# Patient Record
Sex: MALE | Race: BLACK OR AFRICAN AMERICAN | Employment: FULL TIME | URBAN - METROPOLITAN AREA
[De-identification: names, ages, dates, MRNs, and addresses within clinical notes are randomized per-mention and may not be internally consistent; named-entity substitution may affect disease eponyms.]

---

## 2018-02-02 ENCOUNTER — APPOINTMENT (OUTPATIENT)
Dept: GENERAL RADIOLOGY | Age: 68
DRG: 981 | End: 2018-02-02
Attending: INTERNAL MEDICINE
Payer: COMMERCIAL

## 2018-02-02 ENCOUNTER — ANESTHESIA EVENT (OUTPATIENT)
Dept: ENDOSCOPY | Age: 68
DRG: 981 | End: 2018-02-02
Payer: COMMERCIAL

## 2018-02-02 ENCOUNTER — APPOINTMENT (OUTPATIENT)
Dept: INTERVENTIONAL RADIOLOGY/VASCULAR | Age: 68
DRG: 981 | End: 2018-02-02
Attending: SPECIALIST
Payer: COMMERCIAL

## 2018-02-02 ENCOUNTER — APPOINTMENT (OUTPATIENT)
Dept: CT IMAGING | Age: 68
DRG: 981 | End: 2018-02-02
Attending: INTERNAL MEDICINE
Payer: COMMERCIAL

## 2018-02-02 ENCOUNTER — ANESTHESIA (OUTPATIENT)
Dept: INTERVENTIONAL RADIOLOGY/VASCULAR | Age: 68
DRG: 981 | End: 2018-02-02
Payer: COMMERCIAL

## 2018-02-02 ENCOUNTER — HOSPITAL ENCOUNTER (INPATIENT)
Age: 68
LOS: 5 days | Discharge: NURSING, REHAB OR LONG-TERM CARE | DRG: 981 | End: 2018-02-07
Attending: EMERGENCY MEDICINE | Admitting: INTERNAL MEDICINE
Payer: COMMERCIAL

## 2018-02-02 ENCOUNTER — ANESTHESIA (OUTPATIENT)
Dept: ENDOSCOPY | Age: 68
DRG: 981 | End: 2018-02-02
Payer: COMMERCIAL

## 2018-02-02 ENCOUNTER — ANESTHESIA EVENT (OUTPATIENT)
Dept: INTERVENTIONAL RADIOLOGY/VASCULAR | Age: 68
DRG: 981 | End: 2018-02-02
Payer: COMMERCIAL

## 2018-02-02 DIAGNOSIS — R19.7 NAUSEA VOMITING AND DIARRHEA: Primary | ICD-10-CM

## 2018-02-02 DIAGNOSIS — E86.0 DEHYDRATION: ICD-10-CM

## 2018-02-02 DIAGNOSIS — R11.2 NAUSEA VOMITING AND DIARRHEA: Primary | ICD-10-CM

## 2018-02-02 PROBLEM — K92.2 LOWER GI BLEED: Status: ACTIVE | Noted: 2018-02-02

## 2018-02-02 PROBLEM — R31.9 HEMATURIA: Status: ACTIVE | Noted: 2018-02-02

## 2018-02-02 PROBLEM — D62 ACUTE BLOOD LOSS ANEMIA: Status: ACTIVE | Noted: 2018-02-02

## 2018-02-02 PROBLEM — K92.2 UPPER GI BLEEDING: Status: ACTIVE | Noted: 2018-02-02

## 2018-02-02 LAB
ALBUMIN SERPL-MCNC: 2.9 G/DL (ref 3.5–5)
ALBUMIN/GLOB SERPL: 0.9 {RATIO} (ref 1.1–2.2)
ALP SERPL-CCNC: 73 U/L (ref 45–117)
ALT SERPL-CCNC: 20 U/L (ref 12–78)
ANION GAP SERPL CALC-SCNC: 10 MMOL/L (ref 5–15)
APPEARANCE UR: CLEAR
APPEARANCE UR: CLEAR
ARTERIAL PATENCY WRIST A: YES
AST SERPL-CCNC: 16 U/L (ref 15–37)
BACTERIA URNS QL MICRO: NEGATIVE /HPF
BACTERIA URNS QL MICRO: NEGATIVE /HPF
BASE DEFICIT BLDA-SCNC: 4 MMOL/L
BASOPHILS # BLD: 0.1 K/UL (ref 0–0.1)
BASOPHILS NFR BLD: 1 % (ref 0–1)
BDY SITE: ABNORMAL
BILIRUB SERPL-MCNC: 0.3 MG/DL (ref 0.2–1)
BILIRUB UR QL: NEGATIVE
BILIRUB UR QL: NEGATIVE
BREATHS.SPONTANEOUS ON VENT: 28
BUN SERPL-MCNC: 36 MG/DL (ref 6–20)
BUN/CREAT SERPL: 26 (ref 12–20)
CALCIUM SERPL-MCNC: 8.1 MG/DL (ref 8.5–10.1)
CHLORIDE SERPL-SCNC: 104 MMOL/L (ref 97–108)
CO2 SERPL-SCNC: 23 MMOL/L (ref 21–32)
COLOR UR: ABNORMAL
COLOR UR: ABNORMAL
CREAT SERPL-MCNC: 1.37 MG/DL (ref 0.7–1.3)
DIFFERENTIAL METHOD BLD: ABNORMAL
EOSINOPHIL # BLD: 0.1 K/UL (ref 0–0.4)
EOSINOPHIL NFR BLD: 0 % (ref 0–7)
EPITH CASTS URNS QL MICRO: ABNORMAL /LPF
EPITH CASTS URNS QL MICRO: ABNORMAL /LPF
ERYTHROCYTE [DISTWIDTH] IN BLOOD BY AUTOMATED COUNT: 13.2 % (ref 11.5–14.5)
FIO2 ON VENT: 50 %
FLUAV AG NPH QL IA: NEGATIVE
FLUBV AG NOSE QL IA: NEGATIVE
GAS FLOW.O2 O2 DELIVERY SYS: 12 L/MIN
GLOBULIN SER CALC-MCNC: 3.3 G/DL (ref 2–4)
GLUCOSE SERPL-MCNC: 135 MG/DL (ref 65–100)
GLUCOSE UR STRIP.AUTO-MCNC: NEGATIVE MG/DL
GLUCOSE UR STRIP.AUTO-MCNC: NEGATIVE MG/DL
H PYLORI FROM TISSUE: POSITIVE
HCO3 BLDA-SCNC: 19 MMOL/L (ref 22–26)
HCT VFR BLD AUTO: 28.5 % (ref 36.6–50.3)
HCT VFR BLD AUTO: 31.1 % (ref 36.6–50.3)
HEMOCCULT STL QL: POSITIVE
HGB BLD-MCNC: 10 G/DL (ref 12.1–17)
HGB BLD-MCNC: 9.7 G/DL (ref 12.1–17)
HGB UR QL STRIP: ABNORMAL
HGB UR QL STRIP: ABNORMAL
HYALINE CASTS URNS QL MICRO: ABNORMAL /LPF (ref 0–5)
IMM GRANULOCYTES # BLD: 0.1 K/UL (ref 0–0.04)
IMM GRANULOCYTES NFR BLD AUTO: 1 % (ref 0–0.5)
INR PPP: 1.1 (ref 0.9–1.1)
KETONES UR QL STRIP.AUTO: NEGATIVE MG/DL
KETONES UR QL STRIP.AUTO: NEGATIVE MG/DL
KIT LOT NO., HCLOLOT: NORMAL
LACTATE SERPL-SCNC: 1.3 MMOL/L (ref 0.4–2)
LACTATE SERPL-SCNC: 2.5 MMOL/L (ref 0.4–2)
LEUKOCYTE ESTERASE UR QL STRIP.AUTO: NEGATIVE
LEUKOCYTE ESTERASE UR QL STRIP.AUTO: NEGATIVE
LIPASE SERPL-CCNC: 775 U/L (ref 73–393)
LYMPHOCYTES # BLD: 2.3 K/UL (ref 0.8–3.5)
LYMPHOCYTES NFR BLD: 19 % (ref 12–49)
MCH RBC QN AUTO: 31.4 PG (ref 26–34)
MCHC RBC AUTO-ENTMCNC: 32.2 G/DL (ref 30–36.5)
MCV RBC AUTO: 97.8 FL (ref 80–99)
MONOCYTES # BLD: 0.8 K/UL (ref 0–1)
MONOCYTES NFR BLD: 7 % (ref 5–13)
NEGATIVE CONTROL: NEGATIVE
NEUTS SEG # BLD: 8.5 K/UL (ref 1.8–8)
NEUTS SEG NFR BLD: 72 % (ref 32–75)
NITRITE UR QL STRIP.AUTO: NEGATIVE
NITRITE UR QL STRIP.AUTO: NEGATIVE
NRBC # BLD: 0 K/UL (ref 0–0.01)
NRBC BLD-RTO: 0 PER 100 WBC
PCO2 BLDA: 31 MMHG (ref 35–45)
PH BLDA: 7.41 [PH] (ref 7.35–7.45)
PH UR STRIP: 5 [PH] (ref 5–8)
PH UR STRIP: 5 [PH] (ref 5–8)
PLATELET # BLD AUTO: 252 K/UL (ref 150–400)
PMV BLD AUTO: 10 FL (ref 8.9–12.9)
PO2 BLDA: 98 MMHG (ref 80–100)
POSITIVE CONTROL: POSITIVE
POTASSIUM SERPL-SCNC: 4.1 MMOL/L (ref 3.5–5.1)
PROT SERPL-MCNC: 6.2 G/DL (ref 6.4–8.2)
PROT UR STRIP-MCNC: NEGATIVE MG/DL
PROT UR STRIP-MCNC: NEGATIVE MG/DL
PROTHROMBIN TIME: 10.6 SEC (ref 9–11.1)
RBC # BLD AUTO: 3.18 M/UL (ref 4.1–5.7)
RBC #/AREA URNS HPF: ABNORMAL /HPF (ref 0–5)
RBC #/AREA URNS HPF: ABNORMAL /HPF (ref 0–5)
SAO2 % BLD: 98 % (ref 92–97)
SAO2% DEVICE SAO2% SENSOR NAME: ABNORMAL
SODIUM SERPL-SCNC: 137 MMOL/L (ref 136–145)
SP GR UR REFRACTOMETRY: 1.02 (ref 1–1.03)
SP GR UR REFRACTOMETRY: 1.03 (ref 1–1.03)
SPECIMEN SITE: ABNORMAL
UA: UC IF INDICATED,UAUC: ABNORMAL
UROBILINOGEN UR QL STRIP.AUTO: 0.2 EU/DL (ref 0.2–1)
UROBILINOGEN UR QL STRIP.AUTO: 0.2 EU/DL (ref 0.2–1)
WBC # BLD AUTO: 11.8 K/UL (ref 4.1–11.1)
WBC URNS QL MICRO: ABNORMAL /HPF (ref 0–4)
WBC URNS QL MICRO: ABNORMAL /HPF (ref 0–4)

## 2018-02-02 PROCEDURE — 96361 HYDRATE IV INFUSION ADD-ON: CPT

## 2018-02-02 PROCEDURE — 81001 URINALYSIS AUTO W/SCOPE: CPT | Performed by: INTERNAL MEDICINE

## 2018-02-02 PROCEDURE — 74011250636 HC RX REV CODE- 250/636: Performed by: SPECIALIST

## 2018-02-02 PROCEDURE — 36600 WITHDRAWAL OF ARTERIAL BLOOD: CPT | Performed by: INTERNAL MEDICINE

## 2018-02-02 PROCEDURE — 77030033508 HC COIL EMB AZUR CX TERU -G

## 2018-02-02 PROCEDURE — 83690 ASSAY OF LIPASE: CPT | Performed by: EMERGENCY MEDICINE

## 2018-02-02 PROCEDURE — 74011250636 HC RX REV CODE- 250/636: Performed by: INTERNAL MEDICINE

## 2018-02-02 PROCEDURE — 77030014021 HC SYR ANGI FIX LOK MRTM -A

## 2018-02-02 PROCEDURE — 0W3P8ZZ CONTROL BLEEDING IN GASTROINTESTINAL TRACT, VIA NATURAL OR ARTIFICIAL OPENING ENDOSCOPIC: ICD-10-PCS | Performed by: SPECIALIST

## 2018-02-02 PROCEDURE — 99285 EMERGENCY DEPT VISIT HI MDM: CPT

## 2018-02-02 PROCEDURE — 85610 PROTHROMBIN TIME: CPT | Performed by: EMERGENCY MEDICINE

## 2018-02-02 PROCEDURE — C1894 INTRO/SHEATH, NON-LASER: HCPCS

## 2018-02-02 PROCEDURE — 71045 X-RAY EXAM CHEST 1 VIEW: CPT

## 2018-02-02 PROCEDURE — 74011000250 HC RX REV CODE- 250: Performed by: RADIOLOGY

## 2018-02-02 PROCEDURE — 74176 CT ABD & PELVIS W/O CONTRAST: CPT

## 2018-02-02 PROCEDURE — 36430 TRANSFUSION BLD/BLD COMPNT: CPT

## 2018-02-02 PROCEDURE — 96374 THER/PROPH/DIAG INJ IV PUSH: CPT

## 2018-02-02 PROCEDURE — C9113 INJ PANTOPRAZOLE SODIUM, VIA: HCPCS | Performed by: EMERGENCY MEDICINE

## 2018-02-02 PROCEDURE — C1769 GUIDE WIRE: HCPCS

## 2018-02-02 PROCEDURE — 77030003657 HC NDL SCLER BSC -B: Performed by: SPECIALIST

## 2018-02-02 PROCEDURE — 77030007181 HC COIL EMB TORN COOK -B

## 2018-02-02 PROCEDURE — 81001 URINALYSIS AUTO W/SCOPE: CPT | Performed by: EMERGENCY MEDICINE

## 2018-02-02 PROCEDURE — P9016 RBC LEUKOCYTES REDUCED: HCPCS | Performed by: EMERGENCY MEDICINE

## 2018-02-02 PROCEDURE — 65610000006 HC RM INTENSIVE CARE

## 2018-02-02 PROCEDURE — 77030019988 HC FCPS ENDOSC DISP BSC -B: Performed by: SPECIALIST

## 2018-02-02 PROCEDURE — C1760 CLOSURE DEV, VASC: HCPCS

## 2018-02-02 PROCEDURE — 83605 ASSAY OF LACTIC ACID: CPT | Performed by: INTERNAL MEDICINE

## 2018-02-02 PROCEDURE — 76060000033 HC ANESTHESIA 1 TO 1.5 HR

## 2018-02-02 PROCEDURE — 74011250637 HC RX REV CODE- 250/637: Performed by: INTERNAL MEDICINE

## 2018-02-02 PROCEDURE — 82803 BLOOD GASES ANY COMBINATION: CPT | Performed by: INTERNAL MEDICINE

## 2018-02-02 PROCEDURE — 86923 COMPATIBILITY TEST ELECTRIC: CPT | Performed by: EMERGENCY MEDICINE

## 2018-02-02 PROCEDURE — 75726 ARTERY X-RAYS ABDOMEN: CPT

## 2018-02-02 PROCEDURE — C9113 INJ PANTOPRAZOLE SODIUM, VIA: HCPCS | Performed by: INTERNAL MEDICINE

## 2018-02-02 PROCEDURE — 74011250636 HC RX REV CODE- 250/636

## 2018-02-02 PROCEDURE — B410YZZ FLUOROSCOPY OF ABDOMINAL AORTA USING OTHER CONTRAST: ICD-10-PCS | Performed by: RADIOLOGY

## 2018-02-02 PROCEDURE — 77030010324 HC TBNG CNTRST MRTM -A

## 2018-02-02 PROCEDURE — 74011000250 HC RX REV CODE- 250: Performed by: INTERNAL MEDICINE

## 2018-02-02 PROCEDURE — 04V33DZ RESTRICTION OF HEPATIC ARTERY WITH INTRALUMINAL DEVICE, PERCUTANEOUS APPROACH: ICD-10-PCS | Performed by: RADIOLOGY

## 2018-02-02 PROCEDURE — 77030010936 HC CLP LIG BSC -C: Performed by: SPECIALIST

## 2018-02-02 PROCEDURE — 36415 COLL VENOUS BLD VENIPUNCTURE: CPT | Performed by: INTERNAL MEDICINE

## 2018-02-02 PROCEDURE — 86900 BLOOD TYPING SEROLOGIC ABO: CPT | Performed by: EMERGENCY MEDICINE

## 2018-02-02 PROCEDURE — C1892 INTRO/SHEATH,FIXED,PEEL-AWAY: HCPCS

## 2018-02-02 PROCEDURE — 74011636320 HC RX REV CODE- 636/320: Performed by: RADIOLOGY

## 2018-02-02 PROCEDURE — 88312 SPECIAL STAINS GROUP 1: CPT | Performed by: INTERNAL MEDICINE

## 2018-02-02 PROCEDURE — 88112 CYTOPATH CELL ENHANCE TECH: CPT | Performed by: INTERNAL MEDICINE

## 2018-02-02 PROCEDURE — 74011250636 HC RX REV CODE- 250/636: Performed by: EMERGENCY MEDICINE

## 2018-02-02 PROCEDURE — 77010033678 HC OXYGEN DAILY

## 2018-02-02 PROCEDURE — 76060000032 HC ANESTHESIA 0.5 TO 1 HR: Performed by: SPECIALIST

## 2018-02-02 PROCEDURE — 87040 BLOOD CULTURE FOR BACTERIA: CPT | Performed by: INTERNAL MEDICINE

## 2018-02-02 PROCEDURE — 74011000250 HC RX REV CODE- 250

## 2018-02-02 PROCEDURE — 80053 COMPREHEN METABOLIC PANEL: CPT | Performed by: EMERGENCY MEDICINE

## 2018-02-02 PROCEDURE — 76040000007: Performed by: SPECIALIST

## 2018-02-02 PROCEDURE — 74011000258 HC RX REV CODE- 258: Performed by: INTERNAL MEDICINE

## 2018-02-02 PROCEDURE — 85025 COMPLETE CBC W/AUTO DIFF WBC: CPT | Performed by: EMERGENCY MEDICINE

## 2018-02-02 PROCEDURE — 82272 OCCULT BLD FECES 1-3 TESTS: CPT | Performed by: SPECIALIST

## 2018-02-02 PROCEDURE — 30233N1 TRANSFUSION OF NONAUTOLOGOUS RED BLOOD CELLS INTO PERIPHERAL VEIN, PERCUTANEOUS APPROACH: ICD-10-PCS | Performed by: INTERNAL MEDICINE

## 2018-02-02 PROCEDURE — 77030013079 HC BLNKT BAIR HGGR 3M -A

## 2018-02-02 PROCEDURE — 87804 INFLUENZA ASSAY W/OPTIC: CPT | Performed by: EMERGENCY MEDICINE

## 2018-02-02 PROCEDURE — 0DB68ZX EXCISION OF STOMACH, VIA NATURAL OR ARTIFICIAL OPENING ENDOSCOPIC, DIAGNOSTIC: ICD-10-PCS | Performed by: SPECIALIST

## 2018-02-02 PROCEDURE — 85018 HEMOGLOBIN: CPT | Performed by: INTERNAL MEDICINE

## 2018-02-02 PROCEDURE — C1887 CATHETER, GUIDING: HCPCS

## 2018-02-02 PROCEDURE — 77030019698 HC SYR ANGI MDLON MRTM -A

## 2018-02-02 PROCEDURE — 74011250636 HC RX REV CODE- 250/636: Performed by: RADIOLOGY

## 2018-02-02 PROCEDURE — 87077 CULTURE AEROBIC IDENTIFY: CPT | Performed by: SPECIALIST

## 2018-02-02 PROCEDURE — 77030016712 HC CATH ANGI DX SVU3 ANGI -B

## 2018-02-02 RX ORDER — SODIUM CHLORIDE 0.9 % (FLUSH) 0.9 %
5-10 SYRINGE (ML) INJECTION AS NEEDED
Status: DISCONTINUED | OUTPATIENT
Start: 2018-02-02 | End: 2018-02-07 | Stop reason: HOSPADM

## 2018-02-02 RX ORDER — PANTOPRAZOLE SODIUM 40 MG/10ML
40 INJECTION, POWDER, LYOPHILIZED, FOR SOLUTION INTRAVENOUS
Status: COMPLETED | OUTPATIENT
Start: 2018-02-02 | End: 2018-02-02

## 2018-02-02 RX ORDER — PHENYLEPHRINE HCL IN 0.9% NACL 0.4MG/10ML
SYRINGE (ML) INTRAVENOUS AS NEEDED
Status: DISCONTINUED | OUTPATIENT
Start: 2018-02-02 | End: 2018-02-02 | Stop reason: HOSPADM

## 2018-02-02 RX ORDER — DEXTROMETHORPHAN/PSEUDOEPHED 2.5-7.5/.8
1.2 DROPS ORAL
Status: DISCONTINUED | OUTPATIENT
Start: 2018-02-02 | End: 2018-02-02 | Stop reason: HOSPADM

## 2018-02-02 RX ORDER — FENTANYL CITRATE 50 UG/ML
25 INJECTION, SOLUTION INTRAMUSCULAR; INTRAVENOUS
Status: DISCONTINUED | OUTPATIENT
Start: 2018-02-02 | End: 2018-02-02 | Stop reason: HOSPADM

## 2018-02-02 RX ORDER — HEPARIN SODIUM 200 [USP'U]/100ML
800 INJECTION, SOLUTION INTRAVENOUS ONCE
Status: COMPLETED | OUTPATIENT
Start: 2018-02-02 | End: 2018-02-05

## 2018-02-02 RX ORDER — LIDOCAINE HYDROCHLORIDE 20 MG/ML
18 INJECTION, SOLUTION INFILTRATION; PERINEURAL ONCE
Status: COMPLETED | OUTPATIENT
Start: 2018-02-02 | End: 2018-02-02

## 2018-02-02 RX ORDER — MUPIROCIN 20 MG/G
OINTMENT TOPICAL EVERY 12 HOURS
Status: DISCONTINUED | OUTPATIENT
Start: 2018-02-03 | End: 2018-02-07 | Stop reason: HOSPADM

## 2018-02-02 RX ORDER — ONDANSETRON 2 MG/ML
8 INJECTION INTRAMUSCULAR; INTRAVENOUS
Status: COMPLETED | OUTPATIENT
Start: 2018-02-02 | End: 2018-02-02

## 2018-02-02 RX ORDER — SODIUM CHLORIDE 9 MG/ML
250 INJECTION, SOLUTION INTRAVENOUS AS NEEDED
Status: DISCONTINUED | OUTPATIENT
Start: 2018-02-02 | End: 2018-02-05

## 2018-02-02 RX ORDER — SODIUM CHLORIDE 9 MG/ML
50 INJECTION, SOLUTION INTRAVENOUS CONTINUOUS
Status: DISPENSED | OUTPATIENT
Start: 2018-02-02 | End: 2018-02-02

## 2018-02-02 RX ORDER — ACETAMINOPHEN 10 MG/ML
1000 INJECTION, SOLUTION INTRAVENOUS
Status: DISCONTINUED | OUTPATIENT
Start: 2018-02-02 | End: 2018-02-02

## 2018-02-02 RX ORDER — FENTANYL CITRATE 50 UG/ML
INJECTION, SOLUTION INTRAMUSCULAR; INTRAVENOUS AS NEEDED
Status: DISCONTINUED | OUTPATIENT
Start: 2018-02-02 | End: 2018-02-02 | Stop reason: HOSPADM

## 2018-02-02 RX ORDER — SODIUM CHLORIDE 0.9 % (FLUSH) 0.9 %
5-10 SYRINGE (ML) INJECTION AS NEEDED
Status: ACTIVE | OUTPATIENT
Start: 2018-02-02 | End: 2018-02-02

## 2018-02-02 RX ORDER — PROPOFOL 10 MG/ML
INJECTION, EMULSION INTRAVENOUS AS NEEDED
Status: DISCONTINUED | OUTPATIENT
Start: 2018-02-02 | End: 2018-02-02 | Stop reason: HOSPADM

## 2018-02-02 RX ORDER — ONDANSETRON 2 MG/ML
4 INJECTION INTRAMUSCULAR; INTRAVENOUS
Status: DISCONTINUED | OUTPATIENT
Start: 2018-02-02 | End: 2018-02-07 | Stop reason: HOSPADM

## 2018-02-02 RX ORDER — SODIUM CHLORIDE 0.9 % (FLUSH) 0.9 %
5-10 SYRINGE (ML) INJECTION EVERY 8 HOURS
Status: DISCONTINUED | OUTPATIENT
Start: 2018-02-02 | End: 2018-02-07 | Stop reason: HOSPADM

## 2018-02-02 RX ORDER — GUAIFENESIN 100 MG/5ML
81 LIQUID (ML) ORAL DAILY
COMMUNITY
End: 2018-02-07

## 2018-02-02 RX ORDER — SODIUM CHLORIDE 0.9 % (FLUSH) 0.9 %
5-10 SYRINGE (ML) INJECTION EVERY 8 HOURS
Status: ACTIVE | OUTPATIENT
Start: 2018-02-02 | End: 2018-02-02

## 2018-02-02 RX ORDER — SODIUM CHLORIDE 9 MG/ML
INJECTION INTRAMUSCULAR; INTRAVENOUS; SUBCUTANEOUS
Status: COMPLETED
Start: 2018-02-02 | End: 2018-02-02

## 2018-02-02 RX ORDER — ACETAMINOPHEN 650 MG/1
650 SUPPOSITORY RECTAL ONCE
Status: COMPLETED | OUTPATIENT
Start: 2018-02-02 | End: 2018-02-02

## 2018-02-02 RX ORDER — SODIUM CHLORIDE 0.9 % (FLUSH) 0.9 %
5-10 SYRINGE (ML) INJECTION EVERY 8 HOURS
Status: DISPENSED | OUTPATIENT
Start: 2018-02-02 | End: 2018-02-02

## 2018-02-02 RX ORDER — EPINEPHRINE 1 MG/ML
0.2 INJECTION, SOLUTION, CONCENTRATE INTRAVENOUS ONCE
Status: ACTIVE | OUTPATIENT
Start: 2018-02-02 | End: 2018-02-03

## 2018-02-02 RX ORDER — SODIUM CHLORIDE 9 MG/ML
INJECTION, SOLUTION INTRAVENOUS
Status: DISCONTINUED | OUTPATIENT
Start: 2018-02-02 | End: 2018-02-02 | Stop reason: HOSPADM

## 2018-02-02 RX ORDER — MIDAZOLAM HYDROCHLORIDE 1 MG/ML
1-2 INJECTION, SOLUTION INTRAMUSCULAR; INTRAVENOUS
Status: DISCONTINUED | OUTPATIENT
Start: 2018-02-02 | End: 2018-02-02 | Stop reason: HOSPADM

## 2018-02-02 RX ORDER — KETAMINE HYDROCHLORIDE 100 MG/ML
INJECTION, SOLUTION INTRAMUSCULAR; INTRAVENOUS
Status: DISPENSED
Start: 2018-02-02 | End: 2018-02-03

## 2018-02-02 RX ORDER — FENTANYL CITRATE 50 UG/ML
INJECTION, SOLUTION INTRAMUSCULAR; INTRAVENOUS
Status: COMPLETED
Start: 2018-02-02 | End: 2018-02-02

## 2018-02-02 RX ORDER — SODIUM CHLORIDE 9 MG/ML
75 INJECTION, SOLUTION INTRAVENOUS CONTINUOUS
Status: DISCONTINUED | OUTPATIENT
Start: 2018-02-02 | End: 2018-02-04

## 2018-02-02 RX ORDER — LIDOCAINE HYDROCHLORIDE 20 MG/ML
INJECTION, SOLUTION EPIDURAL; INFILTRATION; INTRACAUDAL; PERINEURAL AS NEEDED
Status: DISCONTINUED | OUTPATIENT
Start: 2018-02-02 | End: 2018-02-02 | Stop reason: HOSPADM

## 2018-02-02 RX ORDER — EPINEPHRINE 0.1 MG/ML
1 INJECTION INTRACARDIAC; INTRAVENOUS ONCE
Status: COMPLETED | OUTPATIENT
Start: 2018-02-02 | End: 2018-02-02

## 2018-02-02 RX ADMIN — SODIUM CHLORIDE 1000 ML: 900 INJECTION, SOLUTION INTRAVENOUS at 02:32

## 2018-02-02 RX ADMIN — SODIUM CHLORIDE: 9 INJECTION, SOLUTION INTRAVENOUS at 17:03

## 2018-02-02 RX ADMIN — EPINEPHRINE 1 MG: 0.1 INJECTION, SOLUTION ENDOTRACHEAL; INTRACARDIAC; INTRAVENOUS at 16:26

## 2018-02-02 RX ADMIN — ONDANSETRON HYDROCHLORIDE 8 MG: 2 INJECTION, SOLUTION INTRAMUSCULAR; INTRAVENOUS at 02:32

## 2018-02-02 RX ADMIN — Medication 40 MCG: at 16:21

## 2018-02-02 RX ADMIN — SODIUM CHLORIDE 10 ML: 9 INJECTION, SOLUTION INTRAMUSCULAR; INTRAVENOUS; SUBCUTANEOUS at 06:54

## 2018-02-02 RX ADMIN — SODIUM CHLORIDE 2000 ML: 900 INJECTION, SOLUTION INTRAVENOUS at 05:12

## 2018-02-02 RX ADMIN — ACETAMINOPHEN 650 MG: 650 SUPPOSITORY RECTAL at 20:53

## 2018-02-02 RX ADMIN — FENTANYL CITRATE 25 MCG: 50 INJECTION, SOLUTION INTRAMUSCULAR; INTRAVENOUS at 17:53

## 2018-02-02 RX ADMIN — SODIUM CHLORIDE 1000 ML: 900 INJECTION, SOLUTION INTRAVENOUS at 09:40

## 2018-02-02 RX ADMIN — PANTOPRAZOLE SODIUM 40 MG: 40 INJECTION, POWDER, FOR SOLUTION INTRAVENOUS at 06:53

## 2018-02-02 RX ADMIN — PANTOPRAZOLE SODIUM 40 MG: 40 INJECTION, POWDER, FOR SOLUTION INTRAVENOUS at 19:02

## 2018-02-02 RX ADMIN — Medication 40 MCG: at 16:19

## 2018-02-02 RX ADMIN — PROPOFOL 50 MG: 10 INJECTION, EMULSION INTRAVENOUS at 16:25

## 2018-02-02 RX ADMIN — SODIUM CHLORIDE: 900 INJECTION, SOLUTION INTRAVENOUS at 16:38

## 2018-02-02 RX ADMIN — MEROPENEM 1 G: 1 INJECTION, POWDER, FOR SOLUTION INTRAVENOUS at 21:20

## 2018-02-02 RX ADMIN — PROPOFOL 20 MG: 10 INJECTION, EMULSION INTRAVENOUS at 17:15

## 2018-02-02 RX ADMIN — PROPOFOL 50 MG: 10 INJECTION, EMULSION INTRAVENOUS at 16:21

## 2018-02-02 RX ADMIN — FENTANYL CITRATE 25 MCG: 50 INJECTION, SOLUTION INTRAMUSCULAR; INTRAVENOUS at 17:56

## 2018-02-02 RX ADMIN — FENTANYL CITRATE 25 MCG: 50 INJECTION, SOLUTION INTRAMUSCULAR; INTRAVENOUS at 18:00

## 2018-02-02 RX ADMIN — PROPOFOL 50 MG: 10 INJECTION, EMULSION INTRAVENOUS at 16:30

## 2018-02-02 RX ADMIN — FENTANYL CITRATE 25 MCG: 50 INJECTION, SOLUTION INTRAMUSCULAR; INTRAVENOUS at 17:50

## 2018-02-02 RX ADMIN — HEPARIN SODIUM IN SODIUM CHLORIDE 1000 UNITS: 200 INJECTION INTRAVENOUS at 17:45

## 2018-02-02 RX ADMIN — PROPOFOL 20 MG: 10 INJECTION, EMULSION INTRAVENOUS at 17:20

## 2018-02-02 RX ADMIN — LIDOCAINE HYDROCHLORIDE 50 MG: 20 INJECTION, SOLUTION EPIDURAL; INFILTRATION; INTRACAUDAL; PERINEURAL at 16:21

## 2018-02-02 RX ADMIN — IOPAMIDOL 90 ML: 755 INJECTION, SOLUTION INTRAVENOUS at 17:45

## 2018-02-02 RX ADMIN — PROPOFOL 50 MG: 10 INJECTION, EMULSION INTRAVENOUS at 16:38

## 2018-02-02 RX ADMIN — Medication 10 ML: at 21:19

## 2018-02-02 RX ADMIN — PROPOFOL 20 MG: 10 INJECTION, EMULSION INTRAVENOUS at 17:09

## 2018-02-02 RX ADMIN — SODIUM CHLORIDE 125 ML/HR: 900 INJECTION, SOLUTION INTRAVENOUS at 11:19

## 2018-02-02 RX ADMIN — SODIUM CHLORIDE 1000 ML: 900 INJECTION, SOLUTION INTRAVENOUS at 03:34

## 2018-02-02 RX ADMIN — LIDOCAINE HYDROCHLORIDE 360 MG: 20 INJECTION, SOLUTION INFILTRATION; PERINEURAL at 17:45

## 2018-02-02 NOTE — PROGRESS NOTES
1840 Pt received and temp 102.3 Call placed to  [facility] orders received informed of recent flu exposure and temp after transfusion 1850 bladder gan grady reveals [phone] call to Dr Yvette Yeung pt refused staright cath 1930 pt voided 200 Bedside shift change report given to Meseret Escamilla (oncoming nurse) by Anthony Beach  (offgoing nurse). Report included the following information SBAR, Kardex, ED Summary, OR Summary, Procedure Summary, Intake/Output and MAR.

## 2018-02-02 NOTE — ANESTHESIA PREPROCEDURE EVALUATION
Anesthetic History   No history of anesthetic complications            Review of Systems / Medical History  Patient summary reviewed, nursing notes reviewed and pertinent labs reviewed    Pulmonary  Within defined limits                 Neuro/Psych   Within defined limits           Cardiovascular  Within defined limits                     GI/Hepatic/Renal         Renal disease: ARF       Endo/Other        Anemia     Other Findings   Comments: Upper GIB    Acute Pancreatitis/ETOH abuse         Physical Exam    Airway  Mallampati: II  TM Distance: > 6 cm  Neck ROM: normal range of motion   Mouth opening: Normal     Cardiovascular  Regular rate and rhythm,  S1 and S2 normal,  no murmur, click, rub, or gallop             Dental    Dentition: Poor dentition  Comments: Several missing teeth, moderate decay   Pulmonary  Breath sounds clear to auscultation               Abdominal  GI exam deferred       Other Findings            Anesthetic Plan    ASA: 3  Anesthesia type: total IV anesthesia and MAC          Induction: Intravenous  Anesthetic plan and risks discussed with: Patient

## 2018-02-02 NOTE — ED NOTES
Bedside shift change report given to Saad CARRENO RN (oncoming nurse) by Joy Thompson RN (offgoing nurse). Report included the following information SBAR, Kardex, ED Summary, [facility] and Recent Results. Pt waiting on transport to [facility] at this time.

## 2018-02-02 NOTE — ED TRIAGE NOTES
Pt arrived via EMS complaining of nausea and vomiting x6 times with some chest tightness onset 45 min ago. Pt denies any illness prior to 45 min ago. VSS per EMS. Pt in no acute distress. Will continue to monitor. Call bell within reach.

## 2018-02-02 NOTE — PROGRESS NOTES
Spiritual Care Partner Volunteer visited patient in [place] on 2/2/18. Documented by:  Vijay Ruiz M.Div.    Paging Service [phone])

## 2018-02-02 NOTE — PROCEDURES
Esophagogastroduodenoscopy Procedure Note      Mar Redding Sr.  1950  [id number]    Indication: Acute Upper GI Hemorrhage     Endoscopist: Hui Larsen MD    Referring Provider:  Wilfredo Luis MD    Sedation:  MAC anesthesia Propofol    Procedure Details:  After infomed consent was obtained for the procedure, with all risks and benefits of procedure explained the patient was taken to the endoscopy suite and placed in the left lateral decubitus position. Following sequential administration of sedation as per above, the endoscope was inserted into the mouth and advanced under direct vision to second portion of the duodenum. A careful inspection was made as the gastroscope was withdrawn, including a retroflexed view of the proximal stomach; findings and interventions are described below. Findings:     Esophagus:   + There was whitish exudate in the esophagus in lower 1/2 c/w Candida Esophagitis s/p brushing. Stomach:   - Mild erythema diffusely s/p KALPANA bx to r/o H Pylori. Duodenum:   ++ Ulcer crater with a dark clot and a pigemented protuberance in the center with oozing of red blood from peripheral area of ulcer. We first attempted to place a hemoclip and it began to briskly bleed obscuring the lumen then we injected 3 ml of 1:10,000 epinephrine and then used gold probe 7 FR and due to ongoing bleeding we were unable to control bleeding endoscopically and terminated. Therapies: see above    Specimen: KALPANA Bx           Complications:   None were encountered during the procedure.     EBL:  200 ml          Recommendations:   -treat for H pylori with triple tx when taking PO   -IV PPI BID  -STAT angio for embolization : plan d/w Dr. Morel Ax and with sherin Childs MD  2/2/2018  4:46 PM

## 2018-02-02 NOTE — ED PROVIDER NOTES
EMERGENCY DEPARTMENT HISTORY AND PHYSICAL EXAM      Date: 2/2/2018  Patient Name: Pippa Farris.    History of Presenting Illness     No chief complaint on file. History Provided By: Patient    HPI: Pippa Morelos, 76 y.o. male with no pertinent PMHx, presents via EMS to the ED with cc of an acute onset of nausea, vomiting, and diarrhea x2300 yesterday evening. The pt reports associated sx of chills, mild diffuse lower abdominal cramping, and SOB secondary to sx as well. He expresses that he felt normal prior to 2300 but suddenly began vomiting WNR leading him to call EMS. The pt discloses that he has had recent sick contact with his daughter who is flu positive as of 1/29. He denies any exacerbating or relieving sx. He denies any fevers, chest pain, or dysuria. PCP: Minnie Coleman MD    There are no other complaints, changes, or physical findings at this time. Current Outpatient Prescriptions   Medication Sig Dispense Refill    oxycodone-acetaminophen (PERCOCET) 5-325 mg per tablet Take 1-2 Tabs by mouth every four (4) hours as needed for Pain. Must fill antibiotic to fill narcotic. 20 Tab 0       Past History     Past Medical History:  No past medical history on file. Past Surgical History:  No past surgical history on file. Family History:  No family history on file. Social History:  Social History   Substance Use Topics    Smoking status: Not on file    Smokeless tobacco: Not on file    Alcohol use Not on file       Allergies: Allergies   Allergen Reactions    Pcn [Penicillins] Unknown (comments)     Family history         Review of Systems   Review of Systems   Constitutional: Positive for chills. Negative for activity change, appetite change, fever and unexpected weight change. HENT: Negative for congestion. Eyes: Negative for pain and visual disturbance. Respiratory: Positive for shortness of breath (secondary to sx ). Negative for cough.     Cardiovascular: Negative for chest pain. Gastrointestinal: Positive for abdominal pain (diffuse lower, cramping), diarrhea, nausea and vomiting. Genitourinary: Negative for dysuria. Musculoskeletal: Negative for back pain. Skin: Negative for rash. Neurological: Negative for headaches. Physical Exam   Physical Exam   Constitutional: He is oriented to person, place, and time. He appears well-developed and well-nourished. Elderly male in mild to moderate distress  Hot to touch    HENT:   Head: Normocephalic and atraumatic. Mouth/Throat: Oropharynx is clear and moist.   Eyes: Conjunctivae and EOM are normal. Pupils are equal, round, and reactive to light. Right eye exhibits no discharge. Left eye exhibits no discharge. Neck: Normal range of motion. Neck supple. Cardiovascular: Regular rhythm and normal heart sounds. Tachycardia present. No murmur heard. Pulmonary/Chest: Effort normal and breath sounds normal. No respiratory distress. He has no wheezes. He has no rales. Abdominal: Soft. Bowel sounds are normal. He exhibits no distension. There is no tenderness. There is no rebound and no guarding. Musculoskeletal: Normal range of motion. He exhibits no edema. Neurological: He is alert and oriented to person, place, and time. No cranial nerve deficit. He exhibits normal muscle tone. Skin: Skin is warm. No rash noted. He is diaphoretic (mildly ). Nursing note and vitals reviewed.         Diagnostic Study Results     Labs -     Recent Results (from the past 12 hour(s))   CBC WITH AUTOMATED DIFF    Collection Time: 02/02/18  2:25 AM   Result Value Ref Range    WBC 11.8 (H) 4.1 - 11.1 K/uL    RBC 3.18 (L) 4.10 - 5.70 M/uL    HGB 10.0 (L) 12.1 - 17.0 g/dL    HCT 31.1 (L) 36.6 - 50.3 %    MCV 97.8 80.0 - 99.0 FL    MCH 31.4 26.0 - 34.0 PG    MCHC 32.2 30.0 - 36.5 g/dL    RDW 13.2 11.5 - 14.5 %    PLATELET 162 872 - 120 K/uL    MPV 10.0 8.9 - 12.9 FL    NRBC 0.0 0  WBC    ABSOLUTE NRBC 0.00 0.00 - 0.01 K/uL NEUTROPHILS 72 32 - 75 %    LYMPHOCYTES 19 12 - 49 %    MONOCYTES 7 5 - 13 %    EOSINOPHILS 0 0 - 7 %    BASOPHILS 1 0 - 1 %    IMMATURE GRANULOCYTES 1 (H) 0.0 - 0.5 %    ABS. NEUTROPHILS 8.5 (H) 1.8 - 8.0 K/UL    ABS. LYMPHOCYTES 2.3 0.8 - 3.5 K/UL    ABS. MONOCYTES 0.8 0.0 - 1.0 K/UL    ABS. EOSINOPHILS 0.1 0.0 - 0.4 K/UL    ABS. BASOPHILS 0.1 0.0 - 0.1 K/UL    ABS. IMM. GRANS. 0.1 (H) 0.00 - 0.04 K/UL    DF AUTOMATED     METABOLIC PANEL, COMPREHENSIVE    Collection Time: 02/02/18  2:25 AM   Result Value Ref Range    Sodium 137 136 - 145 mmol/L    Potassium 4.1 3.5 - 5.1 mmol/L    Chloride 104 97 - 108 mmol/L    CO2 23 21 - 32 mmol/L    Anion gap 10 5 - 15 mmol/L    Glucose 135 (H) 65 - 100 mg/dL    BUN 36 (H) 6 - 20 MG/DL    Creatinine 1.37 (H) 0.70 - 1.30 MG/DL    BUN/Creatinine ratio 26 (H) 12 - 20      GFR est AA >60 >60 ml/min/1.73m2    GFR est non-AA 52 (L) >60 ml/min/1.73m2    Calcium 8.1 (L) 8.5 - 10.1 MG/DL    Bilirubin, total 0.3 0.2 - 1.0 MG/DL    ALT (SGPT) 20 12 - 78 U/L    AST (SGOT) 16 15 - 37 U/L    Alk. phosphatase 73 45 - 117 U/L    Protein, total 6.2 (L) 6.4 - 8.2 g/dL    Albumin 2.9 (L) 3.5 - 5.0 g/dL    Globulin 3.3 2.0 - 4.0 g/dL    A-G Ratio 0.9 (L) 1.1 - 2.2     INFLUENZA A & B AG (RAPID TEST)    Collection Time: 02/02/18  2:25 AM   Result Value Ref Range    Influenza A Antigen NEGATIVE  NEG      Influenza B Antigen NEGATIVE  NEG           Medical Decision Making   I am the first provider for this patient. I reviewed the vital signs, available nursing notes, past medical history, past surgical history, family history and social history. Vital Signs-Reviewed the patient's vital signs.   Patient Vitals for the past 12 hrs:   Temp Pulse Resp BP SpO2   02/02/18 0505 - (!) 120 - (!) 81/42 -   02/02/18 0502 - 100 - 91/52 -   02/02/18 0500 - 96 - 110/46 -   02/02/18 0300 - - - 109/53 97 %   02/02/18 0232 - - - 96/48 93 %   02/02/18 0206 98.1 °F (36.7 °C) 88 16 99/49 95 %       Pulse Oximetry Analysis - 95% on room air    Cardiac Monitor:   Rate: 88 bpm  Rhythm: Normal Sinus Rhythm        Records Reviewed: Nursing Notes, Old Medical Records, Ambulance Run Sheet, Previous Radiology Studies and Previous Laboratory Studies    Provider Notes (Medical Decision Making):   Flu exposure with sudden onset of sx today. He appears ill and dehydrated but abdominal exam is benign. Vital signs borderline, will monitor for evidence of sepsis. Low suspicion for biliary pathology, pancreatitis, appendicitis. ED Course:   Initial assessment performed. The patients presenting problems have been discussed, and they are in agreement with the care plan formulated and outlined with them. I have encouraged them to ask questions as they arise throughout their visit. Progress Notes:  3:30 IVF infusing. SBP improving with first liter infusion. Continue current management as pt denies any further symptoms. 4:51 AM  The pt has been re-evaluated. He is sitting at the side of the bed, his heart rate is 90, blood pressure has improved to 115 and after receiving 2L of fluids he has only output 150mL. He expresses that he is still feeling weak but is feeling improved. Will continue to monitor. 05:10 Orthostatic vitals extremely positive after 2 liters of NS infusion. Continue with IVF and symptom management. Lipase added. Given lack of source with influenza sick contacts, will hold antibiotics and discuss with IM for admission. CONSULT NOTE:   5:13 AM  Jovani Guzmán MD spoke with Dr. Stephanie Mcclelland,   Specialty: Hospitalist  Discussed pt's hx, disposition, and available diagnostic and imaging results. Reviewed care plans. Consultant will evaluate pt for admission. Written by Quan Mace ED Scribe, as dictated by Jovani Guzmán MD.    05:35 Dr Stephanie Mcclelland completed evaluation. States patient is telling him that he was vomiting blood at home.  No vomiting in the ER, but patient has had one brown stool approximately 30min ago. No blood but foul smelling. Protonix initiated. Type and screen with INR. CONSULT NOTE:   5:43 AM  Miko Loco MD spoke with Dr. Kai Smith   Specialty: GI on call specialist  Discussed pt's hx, disposition, and available diagnostic and imaging results. Reviewed care plans. Consultant recommends non contrast CT abdominal scan and they will see the patient today in consultation for IM. CRITICAL CARE NOTE :  5:59 AM  IMPENDING DETERIORATION -Respiratory, Cardiovascular, Metabolic and Renal  ASSOCIATED RISK FACTORS - Hypotension, Hypoxia, Dysrhythmia, Metabolic changes and Dehydration  MANAGEMENT- Bedside Assessment and Supervision of Care  INTERPRETATION -  Xrays, ECG and Blood Pressure  INTERVENTIONS - hemodynamic mngmt and Metobolic interventions  CASE REVIEW - Hospitalist, Nursing and Family  TREATMENT RESPONSE -Stable  PERFORMED BY - Self    NOTES   :  I have spent 75 minutes of critical care time involved in lab review, consultations with specialist, family decision- making, bedside attention and documentation. During this entire length of time I was immediately available to the patient . Saravanan Barcenas MD    Critical Care Time: 0 minutes    Disposition:  Admit Note:  5:13 AM  Patient is being admitted to the hospital by Dr. Nida Varma. The results of their tests and reasons for their admission have been discussed with the patient and/or available family. They convey their agreement and understanding for the need to be admitted and for their admission diagnosis. Written by Mazin Alfaro ED Scribe, as dictated by Miko Loco MD.     PLAN:  1. Admission    Diagnosis     Clinical Impression:   1. Nausea vomiting and diarrhea    2. Dehydration        Attestations:    Attestation: This note is prepared by Eddy Alfaro, acting as Scribe for Miko Loco MD.      Miko Loco MD: The scribe's documentation has been prepared under my direction and personally reviewed by me in its entirety. I confirm that the note above accurately reflects all work, treatment, procedures, and medical decision making performed by me.

## 2018-02-02 NOTE — CONSULTS
Gastroenterology Consult     Referring Physician: Dr. Oneida Lundy Date: 2/2/2018     Subjective:     Chief Complaint: nausea, vomiting with blood in emesis and stool    History of Present Illness: Jackie Francisco is a 76 y.o. male who is seen in consultation for GIB. He presented to ER 0300 this AM for vomiting blood, rectal bleeding and SOB. Vomiting started 2300. There was dark red blood in the emesis and coffee grounds. Described as quite a bit of blood multiple times. He also had dark red blood per rectum as well. \"Quit a bit\" multiple times. There was also black stools. Last episode of bloody emesis and hematochezia was 0100. Hgb at 0225 was 10.0. We do not have another for comparison. States he had labs on 1/25/18 and anemia was not mentioned. He was worried he was going to suffocate in his vomit. He laied down on the floor and vomited all over himself. He felt like he was going to pass out but did not lose consciousness. +Minor chest pain. No blood thinners. Baby ASA daily otherwise no NSAIDs. Spent at least 9 months in [facility] and other  countries. No known hx of H. Pylori. He has been taking iron for iron deficiency diagnosed by the [facility] a year ago. Has never had an evaluation of [facility]. No hx of a GIB. Never required a blood transfusion. Has never had EGD. He had colonoscopy in 2013. He had 4 polyps removed. He thinks 5 year recall was recommended. No FH of colon cancer. Diagnosed with prostate cancer 2013. Had prostate biopsy on 1/19/18. After that he started feeling bad. Has had reflux and nausea and poor appetite. On 1/25/18 developed chills. Slight abd pain, lower. +back pain, mid to lower, chronic. Drinks wine with meals. No more than 2 glasses per day and no recent binges. Still has gallbladder. No hx of pancreatitis. Non contrast CT showed enlarged pancreatic head with stranding c/w acute pancreatitis and lipase was elevated to 775.   BUN and Crt are also up. NO hx of renal failure. He was hypotensive in the ER.  +Weight loss of 5-8 lbs in the past week. Daughter was diagnosed with the flu. He's had cough/congestion but no fever. Flu swab in ER was negative. PMH:  Prostate cancer    No past surgical history     No significant GI family history. Social History   Substance Use Topics    Smoking status: Quit 3 years ago    Smokeless tobacco: Not on file    Alcohol use Wine, 2 glasses/night      Allergies   Allergen Reactions    Pcn [Penicillins] Unknown (comments)     Family history     Current Facility-Administered Medications   Medication Dose Route Frequency    pantoprazole (PROTONIX) 40 mg in sodium chloride 0.9 % 10 mL injection  40 mg IntraVENous Q12H    0.9% sodium chloride infusion  125 mL/hr IntraVENous CONTINUOUS    sodium chloride (NS) flush 5-10 mL  5-10 mL IntraVENous Q8H    sodium chloride (NS) flush 5-10 mL  5-10 mL IntraVENous PRN    ondansetron (ZOFRAN) injection 4 mg  4 mg IntraVENous Q4H PRN        Review of Systems:  A detailed 10 organ review of systems is obtained with pertinent positives as listed in the History of Present Illness and Past Medical History. A detailed review of systems was performed as follows:  Constitutional:  +chills, weight loss  Eyes:  No ocular sensitivity to the sun, blurred vision or double vision. ENMT:  No nose or sinus problems. Respiratory: +SOB. Cardiac:  +mild chest pain, exertional chest pain no palpitations  Gastrointestinal:  See history of the present illness  :   +skight pain with urination + hematuria since prostate bx  Musculoskeletal:  + arthritis or hot swollen joints. Endocrine:  No thyroid disease or diabetes  Psychiatric: +depression +PTSD. No SI/HI. Integumentary:  No skin rash or sensitivity to the sun. Neurologic:  No stroke or seizure; + numbness or tingling in fingers since prostate bx  Heme-Lymphatic:  + history of iron def.    no unexplained lumps or bumps    Objective:     Physical Exam:  Visit Vitals    /48 (BP 1 Location: Left arm, BP Patient Position: Sitting)    Pulse (!) 103    Temp 98.6 °F (37 °C)    Resp 16    Ht 6' (1.829 m)    Wt 86.2 kg (190 lb)    SpO2 97%    BMI 25.77 kg/m2        Gen: Awake, alert, oriented  Skin:  Extremities and face reveal no rashes. HEENT: Sclerae anicteric. No oral ulcers. No abnormal pigmentation of the lips. Cardiovascular: Regular rate and rhythm. No murmurs, gallops, or rubs. Respiratory:  Comfortable breathing with no accessory muscle use. Crackles bilateral bases with no wheezes, rales, or rhonchi. GI:  Abdomen nondistended, soft, normal bowel sounds. Tenderness throughout. No guarding or rebounding. No enlargement of the liver or spleen. No masses palpable. Rectal:  Scant dark ?maroon stool. Occult blood pending (hemoccult card sent to lab)  Musculoskeletal:  No pitting edema of the lower legs. Neurological:  Gross memory appears intact. Patient is alert and oriented. Psychiatric:  Mood appears appropriate with judgement intact. Lymphatic:  No cervical or supraclavicular adenopathy. Lab/Data Review:  Lab Results   Component Value Date/Time    WBC 11.8 02/02/2018 02:25 AM    HGB 10.0 02/02/2018 02:25 AM    HCT 31.1 02/02/2018 02:25 AM    PLATELET 736 07/64/7636 02:25 AM    MCV 97.8 02/02/2018 02:25 AM     Lab Results   Component Value Date/Time    Sodium 137 02/02/2018 02:25 AM    Potassium 4.1 02/02/2018 02:25 AM    Chloride 104 02/02/2018 02:25 AM    CO2 23 02/02/2018 02:25 AM    Anion gap 10 02/02/2018 02:25 AM    Glucose 135 02/02/2018 02:25 AM    BUN 36 02/02/2018 02:25 AM    Creatinine 1.37 02/02/2018 02:25 AM    BUN/Creatinine ratio 26 02/02/2018 02:25 AM    GFR est AA >60 02/02/2018 02:25 AM    GFR est non-AA 52 02/02/2018 02:25 AM    Calcium 8.1 02/02/2018 02:25 AM    Bilirubin, total 0.3 02/02/2018 02:25 AM    AST (SGOT) 16 02/02/2018 02:25 AM    Alk.  phosphatase 73 02/02/2018 02:25 AM    Protein, total 6.2 02/02/2018 02:25 AM    Albumin 2.9 02/02/2018 02:25 AM    Globulin 3.3 02/02/2018 02:25 AM    A-G Ratio 0.9 02/02/2018 02:25 AM    ALT (SGPT) 20 02/02/2018 02:25 AM     Lab Results   Component Value Date/Time    INR 1.1 02/02/2018 05:59 AM    Prothrombin time 10.6 02/02/2018 05:59 AM     CT Results (most recent):    Results from Hospital Encounter encounter on 02/02/18   CT ABD PELV WO CONT   Narrative EXAM:  CT ABD PELV WO CONT    INDICATION: Abdominal Discomfort, recent prostate Biopsy, nausea and vomiting    COMPARISON: None    CONTRAST:  None. TECHNIQUE:   Thin axial images were obtained through the abdomen and pelvis. Coronal and  sagittal reconstructions were generated. Oral contrast was not administered. CT  dose reduction was achieved through use of a standardized protocol tailored for  this examination and automatic exposure control for dose modulation. The absence of intravenous contrast material reduces the sensitivity for  evaluation of the solid parenchymal organs of the abdomen. FINDINGS:   LUNG BASES: Clear. INCIDENTALLY IMAGED HEART AND MEDIASTINUM: Unremarkable. LIVER: No mass or biliary dilatation. GALLBLADDER: Unremarkable. SPLEEN: No mass. PANCREAS: The pancreatic head is mildly enlarged with adjacent stranding likely  related to pancreatitis. ADRENALS: Unremarkable. KIDNEYS/URETERS: No mass, calculus, or hydronephrosis. STOMACH: Unremarkable. SMALL BOWEL: No dilatation or wall thickening. COLON: No dilatation or wall thickening. APPENDIX: Unremarkable. PERITONEUM: No ascites or pneumoperitoneum. RETROPERITONEUM: No lymphadenopathy or aortic aneurysm. REPRODUCTIVE ORGANS: The prostate gland is enlarged. URINARY BLADDER: No mass or calculus. The bladder is mildly distended. BONES: There is a 1.1 cm sclerotic lesion in the right posterior iliac bone. This has a thin rim of sclerosis with internal hypodensity.  There is no  aggressive feature. There is a prominent Schmorl's node in the superior endplate  of L2.  ADDITIONAL COMMENTS: There are bilateral hydroceles, greater on the right. Impression IMPRESSION:    1. Mild pancreatic head enlargement with adjacent stranding likely related to  pancreatitis. 2. Prostate hypertrophy with bladder distention. 3. Small nonaggressive appearing sclerotic lesion in the right iliac bone. 4. Bilateral hydroceles, greater on the right. Assessment/Plan:     Acute Pancreatitis    Acute blood loss anemia (2/2/2018)      Hematuria (2/2/2018)      Upper GI bleeding (2/2/2018)      Lower GI bleed (2/2/2018)         I feel patient's primary problem is acute pancreatitis with SIRS and acute renal failure. I recommend aggressive IVF hydration, bowel rest and close monitoring. Symptom control for pain and vomiting as needed. I ordered a lactated ringers fluid bolus and asked the nurse to put him on continuous monitoring. Check U/S to look for gallstones as these can be missed on CT. Check CXR PA/Lat to rule out pneumonia/heart failure. His GIB may be a separate issue or perhaps he had nausea/vomiting from the pancreatitis and developed a melissa ronquillo tear. Follow hgb closely. Heme check stool. Keep NPO for possible EGD later today. +Risk factors for H pylori. I cannot correlate these events with recent prostate bx though I cannot ignore the timing. I have communicated with Dr. Eden Gale and Dr. Mariama Garcia as well as nursing staff, patient and family and all are in agreement with the plan.       JANKI Sanz  02/02/18  9:17 AM

## 2018-02-02 NOTE — PROGRESS NOTES
Blood transfusion consent signed and education provided. Patient verbalized understanding. Blood obtained from blood bank. Blood checked with 2 RN and started.

## 2018-02-02 NOTE — PROGRESS NOTES
Name of procedure: Emergent Duodenal Ulcer embolization    Complications, if any, r/t procedure: none    Sedation medications given: Per anesthesia    VS : Stable     Post Procedure Care Needed/order sets in [facility]care: see orders    Procedure completed. VSS. See CRNA flowsheets. No bleeding or hematoma noted to site. PPP. Pt taken to CCU with Cristal GARNER. Pt placed on CCU monitor. Report given to Libbie Litten, RN. CCU RN to assume care of pt. Dr. Antionette Smalls gave updates to family.

## 2018-02-02 NOTE — ANESTHESIA POSTPROCEDURE EVALUATION
Post-Anesthesia Evaluation and Assessment    Patient: Chang Best Sr. MRN: [id number]  SSN: [id number]    YOB: 1950  Age: 76 y.o. Sex: male       Cardiovascular Function/Vital Signs  Visit Vitals    /59    Pulse 92    Temp 37.7 °C (99.8 °F)    Resp 22    Ht 6' (1.829 m)    Wt 86.2 kg (190 lb)    SpO2 100%    BMI 25.77 kg/m2       Patient is status post total IV anesthesia, MAC anesthesia for Procedure(s):  ESOPHAGOGASTRODUODENOSCOPY (EGD)  ENDOSCOPIC BRUSHING  ESOPHAGOGASTRODUODENAL (EGD) BIOPSY  RESOLUTION CLIP x 2   INJECTION. Nausea/Vomiting: None    Postoperative hydration reviewed and adequate. Pain:  Pain Scale 1: Numeric (0 - 10) (02/02/18 1546)  Pain Intensity 1: 0 (02/02/18 1546)   Managed    Neurological Status: At baseline    Mental Status and Level of Consciousness: Arousable    Pulmonary Status:   O2 Device: Nasal cannula (02/02/18 3943)   Adequate oxygenation and airway patent    Complications related to anesthesia: None    Post-anesthesia assessment completed. Patient with uncontrolled GI Bleed from peptic ulcer. Patient taken to angiography for embolization.     Signed By: Darren Sylvester MD     February 2, 2018

## 2018-02-02 NOTE — PROGRESS NOTES
Patient arrived to [facility] remains on 3Lnc. Vital signs stable BP 90/53. He is alert and oriented. New EGD consent obtained reflecting EGD with MAC sedation.

## 2018-02-02 NOTE — PROGRESS NOTES
1330 TRANSFER - IN REPORT:    Verbal report received from Ortho(name) on [employer].  being received from Ortho(unit) for routine progression of care      Report consisted of patients Situation, Background, Assessment and   Recommendations(SBAR). Information from the following report(s) SBAR, Kardex, Intake/Output, MAR, Recent Results and Cardiac Rhythm NSR/ST was reviewed with the receiving nurse. Opportunity for questions and clarification was provided. Assessment completed upon patients arrival to unit and care assumed.          1900 pt not received on PCU, transferred to CCU after procedures

## 2018-02-02 NOTE — H&P
Hospitalist Admission Note    NAME: Sherri Naylor Sr.   :  1950   MRN:  [id number]     Date/Time:  2018 5:44 AM    Patient PCP: Debbie Murillo MD  ________________________________________________________________________    My assessment of this patient's clinical condition and my plan of care is as follows. Assessment / Plan:  SIRs with leukocytosis, tachycardia  Orthostatic Hypotension  Anemia (suspect acute blood loss)  In settings of GI bleed (Pt reported recent noticing blood in stool and now reported coffee ground vomiting with some bright red blood)  Admit to tele  ED is calling oncall GI as per discussion with Dr Betty Olivera  NPO  IV PPIs  Daily Orthostatic  Monitor H&H    Recent Diarrhea  Claims to have resolved 3 days ago, if reoccurs then consider checking for c.diff considering recent use of abx    Hematuria, Suprapubic Tenderness   Pt reports all the problems started since recent prostate biopsy. He was prescribed 10 days of abx but stopped taking after 3 days and he though abx were making him sick  Check US and CT A/P renal protocol    Mild renal insufficiency  Suspect acute   Monitor renal function with IV hydration    Code Status: Full  Surrogate Decision Maker: Son    DVT Prophylaxis: SCDs    Baseline: functional        Subjective:   CHIEF COMPLAINT: nausea and vomiting    HISTORY OF PRESENT ILLNESS:     Sherri Naylor is a 76 y.o.  male who presents with nausea and vomiting started last night. Upon questioning color of vomit pt indicated coffee ground with bright red blood. He claims to have 4-5 episodes prior coming to ED. Pt reported that he is been feeling sick for past 13 days since the prostate biopsy at [place]. Pt reported intially noticing hematuria, gross blood in stool but later blood in stool got resolved. He also reports, started to have diarrhea which got resolved 3 days ago.  Last night started to have vomiting bright red material. He does report eating beet last night. Pt denies any fever, chills, chest pain, cough, shortness of breath and abdominal pain. We were asked to admit for work up and evaluation of the above problems. Past medical history: prostate enlargement     Past surgical history: recent prostate biopsy    Social History   Substance Use Topics    Smoking status: Denies use    Smokeless tobacco: Not on file    Alcohol use Occasional use of wine        Family history: positive for DM and HTN in the famly    Allergies   Allergen Reactions    Pcn [Penicillins] Unknown (comments)     Family history        Prior to Admission medications    Not on File       REVIEW OF SYSTEMS:     I am not able to complete the review of systems because:    The patient is intubated and sedated    The patient has altered mental status due to his acute medical problems    The patient has baseline aphasia from prior stroke(s)    The patient has baseline dementia and is not reliable historian    The patient is in acute medical distress and unable to provide information           Total of 12 systems reviewed as follows:       POSITIVE= underlined text  Negative = text not underlined  General:  fever, chills, sweats, generalized weakness, weight loss/gain,      loss of appetite   Eyes:    blurred vision, eye pain, loss of vision, double vision  ENT:    rhinorrhea, pharyngitis   Respiratory:   cough, sputum production, SOB, EVERETT, wheezing, pleuritic pain   Cardiology:   chest pain, palpitations, orthopnea, PND, edema, syncope   Gastrointestinal:  abdominal pain , N/V, diarrhea, dysphagia, constipation, bleeding   Genitourinary:  frequency, urgency, dysuria, hematuria, incontinence   Muskuloskeletal :  arthralgia, myalgia, back pain  Hematology:  easy bruising, nose or gum bleeding, lymphadenopathy   Dermatological: rash, ulceration, pruritis, color change / jaundice  Endocrine:   hot flashes or polydipsia   Neurological:  headache, dizziness, confusion, focal weakness, paresthesia,     Speech difficulties, memory loss, gait difficulty  Psychological: Feelings of anxiety, depression, agitation    Objective:   VITALS:    Visit Vitals    BP (!) 81/42 (BP Patient Position: Standing)    Pulse (!) 120    Temp 98.1 °F (36.7 °C)    Resp 16    Ht 6' (1.829 m)    Wt 86.2 kg (190 lb)    SpO2 97%    BMI 25.77 kg/m2       PHYSICAL EXAM:    General:    Alert, cooperative, no distress, appears stated age. HEENT: Atraumatic, anicteric sclerae, pink conjunctivae     No oral ulcers, mucosa dry, throat clear, dentition fair  Neck:  Supple, symmetrical,  thyroid: non tender  Lungs:   Clear to auscultation bilaterally. No Wheezing or Rhonchi. No rales. Chest wall:  No tenderness  No Accessory muscle use. Heart:   Regular  rhythm,  No  murmur   No edema  Abdomen:   Soft, supra pubic tenderness. Not distended. Bowel sounds normal  Extremities: No cyanosis. No clubbing,      Skin turgor normal, Capillary refill normal, Radial dial pulse 2+  Skin:     Not pale. Not Jaundiced  No rashes   Psych:  Good insight. Not depressed. Not anxious or agitated. Neurologic: EOMs intact. No facial asymmetry. No aphasia or slurred speech. Symmetrical strength, Sensation grossly intact.  Alert and oriented X 4.     _______________________________________________________________________  Care Plan discussed with:    Comments   Patient y    Family      RN y    Care Manager                    Consultant:  karthik ED physician   _______________________________________________________________________  Expected  Disposition:   Home with Family y   HH/PT/OT/RN    SNF/LTC    KELSEY    ________________________________________________________________________  TOTAL TIME: 61 Minutes    Critical Care Provided     Minutes non procedure based      Comments    y Reviewed previous records   >50% of visit spent in counseling and coordination of care y Discussion with patient and questions answered ________________________________________________________________________  Signed: Vielka Read MD    Procedures: see electronic medical records for all procedures/Xrays and details which were not copied into this note but were reviewed prior to creation of Plan. LAB DATA REVIEWED:    Recent Results (from the past 24 hour(s))   CBC WITH AUTOMATED DIFF    Collection Time: 02/02/18  2:25 AM   Result Value Ref Range    WBC 11.8 (H) 4.1 - 11.1 K/uL    RBC 3.18 (L) 4.10 - 5.70 M/uL    HGB 10.0 (L) 12.1 - 17.0 g/dL    HCT 31.1 (L) 36.6 - 50.3 %    MCV 97.8 80.0 - 99.0 FL    MCH 31.4 26.0 - 34.0 PG    MCHC 32.2 30.0 - 36.5 g/dL    RDW 13.2 11.5 - 14.5 %    PLATELET 176 795 - 615 K/uL    MPV 10.0 8.9 - 12.9 FL    NRBC 0.0 0  WBC    ABSOLUTE NRBC 0.00 0.00 - 0.01 K/uL    NEUTROPHILS 72 32 - 75 %    LYMPHOCYTES 19 12 - 49 %    MONOCYTES 7 5 - 13 %    EOSINOPHILS 0 0 - 7 %    BASOPHILS 1 0 - 1 %    IMMATURE GRANULOCYTES 1 (H) 0.0 - 0.5 %    ABS. NEUTROPHILS 8.5 (H) 1.8 - 8.0 K/UL    ABS. LYMPHOCYTES 2.3 0.8 - 3.5 K/UL    ABS. MONOCYTES 0.8 0.0 - 1.0 K/UL    ABS. EOSINOPHILS 0.1 0.0 - 0.4 K/UL    ABS. BASOPHILS 0.1 0.0 - 0.1 K/UL    ABS. IMM. GRANS. 0.1 (H) 0.00 - 0.04 K/UL    DF AUTOMATED     METABOLIC PANEL, COMPREHENSIVE    Collection Time: 02/02/18  2:25 AM   Result Value Ref Range    Sodium 137 136 - 145 mmol/L    Potassium 4.1 3.5 - 5.1 mmol/L    Chloride 104 97 - 108 mmol/L    CO2 23 21 - 32 mmol/L    Anion gap 10 5 - 15 mmol/L    Glucose 135 (H) 65 - 100 mg/dL    BUN 36 (H) 6 - 20 MG/DL    Creatinine 1.37 (H) 0.70 - 1.30 MG/DL    BUN/Creatinine ratio 26 (H) 12 - 20      GFR est AA >60 >60 ml/min/1.73m2    GFR est non-AA 52 (L) >60 ml/min/1.73m2    Calcium 8.1 (L) 8.5 - 10.1 MG/DL    Bilirubin, total 0.3 0.2 - 1.0 MG/DL    ALT (SGPT) 20 12 - 78 U/L    AST (SGOT) 16 15 - 37 U/L    Alk.  phosphatase 73 45 - 117 U/L    Protein, total 6.2 (L) 6.4 - 8.2 g/dL    Albumin 2.9 (L) 3.5 - 5.0 g/dL    Globulin 3.3 2.0 - 4.0 g/dL    A-G Ratio 0.9 (L) 1.1 - 2.2     INFLUENZA A & B AG (RAPID TEST)    Collection Time: 02/02/18  2:25 AM   Result Value Ref Range    Influenza A Antigen NEGATIVE  NEG      Influenza B Antigen NEGATIVE  NEG     LIPASE    Collection Time: 02/02/18  2:25 AM   Result Value Ref Range    Lipase 775 (H) 73 - 393 U/L

## 2018-02-02 NOTE — ED NOTES
TRANSFER - OUT REPORT:    Verbal report given to ortho RN (name) on Kathy Sesay.  being transferred to ortho (unit) for routine progression of care       Report consisted of patients Situation, Background, Assessment and   Recommendations(SBAR). Information from the following report(s) SBAR, Kardex, ED Summary, Intake/Output, MAR and Recent Results was reviewed with the receiving nurse. Lines:   Peripheral IV 02/02/18 Right Hand (Active)   Site Assessment Clean, dry, & intact 2/2/2018  2:33 AM   Phlebitis Assessment 0 2/2/2018  2:33 AM   Infiltration Assessment 0 2/2/2018  2:33 AM   Dressing Status Clean, dry, & intact 2/2/2018  2:33 AM   Dressing Type 4 X 4;Transparent 2/2/2018  2:33 AM   Hub Color/Line Status Pink;Flushed 2/2/2018  2:33 AM   Action Taken Catheter retaped 2/2/2018  2:33 AM        Opportunity for questions and clarification was provided.       Patient transported with:   [employer]

## 2018-02-02 NOTE — PERIOP NOTES
TRANSFER - IN REPORT:    Verbal report received from [facility] on [address].  being received from 3240(unit) for ordered procedure      Report consisted of patients Situation, Background, Assessment and   Recommendations(SBAR). Information from the following report(s) SBAR, Procedure Summary and MAR was reviewed with the receiving nurse. Opportunity for questions and clarification was provided. Assessment completed upon patients arrival to unit and care assumed.

## 2018-02-02 NOTE — ED NOTES
Pt tolerated PO intake. Pt orthostatic. Supine- HR 96, /46; sitting- , BP 91/52; standing HR-120, BP 81/42.

## 2018-02-02 NOTE — PROGRESS NOTES
Pt arrived in angio via stretcher. Endo RNs, CRNA at bedside. Emergency consent being obtained by Dr. Colt Gomez who is speaking with son. Pt placed directly on the table. Pt being prepped for procedure.

## 2018-02-02 NOTE — PROGRESS NOTES
Hospitalist Progress Note    NAME: Margarito Edwards Sr.   :  1950   MRN:  [id number]       Assessment / Plan:  Addendum 2:  -case discussed with Dr. Karri Raymundo  -Ulcer in Duodenal Bulb with Visible vessle taht contined to bleed despite clipping, inject of epi and APC  -Patient to Angio, Dr. Ar Nolan, for embolization  -Embolization complete around 6 pm and patient went to ICU  -will need to check a post transfusion Hgb now 3rd unit pRBC's is complete, RN aware  -Febrile and Tachycardic (Sepsis) with adequate BP per my discussion with ICU nurse  -start Meropenem (allergy to PCN), check BCx's and Lactate  ___________  Addendum:  Repeat Hgb 6.9, 2 units ordered  -Lactate 2.5 but should improve with pRBC's  -BP in 79'V systolic and HR in low 79'A (at rest)  -will transfer to stepdown for precaution  -Case again discussed with Dr. Karri Raymundo and EGD for 3:30 pm  -patient and family aware  __________  Upper GIB with Coffee Ground Emesis and Melena  Strongly Suspect Acute Blood Loss Anemia  -bolus 1 L NS  -otherwise Maintenance NS at 125 mL/hr  -Continue IV Protonix q12h  -Hgb 10, baseline Hgb unknown, repeat Hgb now; check Lactate as well; if Hgb shows downtrend will transfuse 1 unit and re-evaluate  -patient advised on the risks and benefits of blood transfusion  -type and cross already sent  -place O2  -bedrest for now as patient lightheaded with just sitting up  -Spoke with Jose Duarte from GI, awaiting call back from Dr. Karri Raymundo  -continue NPO    Acute Pancreatitis: Drinks one bottle of wine nightly  -monitor Lipase  -RUQ ultrasound for completeness (GB unremarkable on CT), LFT's normal     SIRs with leukocytosis, tachycardia  -from GIB  -get CXR for completeness as patient may have had a aspiration event with his coffee ground emesis    Orthostatic Hypotension:  -IVF's and bedrest for now    Hematuria, Suprapubic Tenderness   -monitor     Mild renal insufficiency; baseline renal function unknown  Suspect acute Monitor renal function with IV hydration     Code Status: Full  Surrogate Decision Maker: Son     DVT Prophylaxis: SCDs     Body mass index is 25.77 kg/(m^2). Mr. Rome Gleason is critically ill and at great rsik for further decompensation. A total of 40 minutes of non-procedure based critical care was provided during this encounter. Subjective:     Chief Complaint / Reason for Physician Visit: follow-up GIB  Patient reports melena and predominantly coffee ground emesis simultaneously at home  States that he was shocked at the amount of emesis  Describes presyncope, states that he would not let himself lose consciousness because he thought it would be the end  Has left sided mid abdominal pain  Last vomited about 6 hours ago  Still lightheaded with just sitting up    Review of Systems:  Symptom Y/N Comments  Symptom Y/N Comments   Fever/Chills n   Chest Pain n    Poor Appetite    Edema n    Cough    Abdominal Pain     Sputum    Joint Pain     SOB/EVERETT y After vomiting he felt that he could not catch his breath  Pruritis/Rash     Nausea/vomit    Tolerating PT/OT     Diarrhea    Tolerating Diet     Constipation    Other       Could NOT obtain due to:      Objective:     VITALS:   Last 24hrs VS reviewed since prior progress note. Most recent are:  Patient Vitals for the past 24 hrs:   Temp Pulse Resp BP SpO2   02/02/18 0746 98.6 °F (37 °C) (!) 103 16 117/48 97 %   02/02/18 0645 - - - - 95 %   02/02/18 0624 - - - - 97 %   02/02/18 0600 - 84 - 104/53 -   02/02/18 0515 - - - 106/55 -   02/02/18 0505 - (!) 120 - (!) 81/42 -   02/02/18 0502 - 100 - 91/52 -   02/02/18 0500 - 96 - 110/46 -   02/02/18 0400 - - - 111/64 92 %   02/02/18 0330 - - - 106/57 95 %   02/02/18 0300 - - - 109/53 97 %   02/02/18 0232 - - - 96/48 93 %   02/02/18 0206 98.1 °F (36.7 °C) 88 16 99/49 95 %     No intake or output data in the 24 hours ending 02/02/18 0939     PHYSICAL EXAM:  General: WD, WN.  Alert, cooperative, no acute distress    EENT:  EOMI. Anicteric sclerae. MM dry  Resp:  CTA bilaterally, no wheezing or rales. No accessory muscle use  CV:  Mild tachycardia,  No edema  GI:  Soft, mildly distended, Mildly tender. Bowel sounds present  Neurologic:  Alert and oriented X 3, normal speech,   Psych:   Good insight. Not anxious nor agitated  Skin:  No rashes. No jaundice. Pale    Reviewed most current lab test results and cultures  YES  Reviewed most current radiology test results   YES  Review and summation of old records today    NO  Reviewed patient's current orders and MAR    YES  PMH/SH reviewed - no change compared to H&P  ________________________________________________________________________  Care Plan discussed with:    Comments   Patient x    Family  x    RN x    Care Manager     Consultant  x GI                     Multidiciplinary team rounds were held today with , nursing, pharmacist and clinical coordinator. Patient's plan of care was discussed; medications were reviewed and discharge planning was addressed. ________________________________________________________________________  Total NON critical care TIME:      Total CRITICAL CARE TIME Spent: 40  Minutes non procedure based      Comments   >50% of visit spent in counseling and coordination of care x    ________________________________________________________________________  Niesha Zayas MD     Procedures: see electronic medical records for all procedures/Xrays and details which were not copied into this note but were reviewed prior to creation of Plan. LABS:  I reviewed today's most current labs and imaging studies.   Pertinent labs include:  Recent Labs      02/02/18   0225   WBC  11.8*   HGB  10.0*   HCT  31.1*   PLT  252     Recent Labs      02/02/18   0559  02/02/18   0225   NA   --   137   K   --   4.1   CL   --   104   CO2   --   23   GLU   --   135*   BUN   --   36*   CREA   --   1.37*   CA   --   8.1*   ALB   --   2.9*   TBILI --   0.3   SGOT   --   16   ALT   --   20   INR  1.1   --        Signed: Nadira Valdez MD

## 2018-02-02 NOTE — IP AVS SNAPSHOT
[address] [facility] 
[phone] Patient: Lorelei Ko. MRN: [id number] SPS:[id number] About your hospitalization You were admitted on:  February 2, 2018 You last received care in the:  [facility] [place] [facility] GENERAL SURGERY You were discharged on:  February 7, 2018 Why you were hospitalized Your primary diagnosis was:  Not on File Your diagnoses also included:  Acute Blood Loss Anemia, Hematuria, Upper Gi Bleeding, Lower Gi Bleed Follow-up Information Follow up With Details Comments Contact Info Olayinka Smith MD Schedule an appointment as soon as possible for a visit in 1 week for post hospital follow up appointment. [address] [facility] 
[phone] Kassie Chowdhury MD Schedule an appointment as soon as possible for a visit  [facility] [address] [facility] 
[phone] Discharge Orders None A check bigg indicates which time of day the medication should be taken. My Medications START taking these medications Instructions Each Dose to Equal  
 Morning Noon Evening Bedtime  
 acetaminophen 500 mg tablet Commonly known as:  TYLENOL Your last dose was: Your next dose is: Take 1 Tab by mouth every six (6) hours as needed. 500 mg  
    
   
   
   
  
 bismuth subsalicylate 592 QR/34 mL suspension Commonly known as:  PEPTO-BISMOL Your last dose was: Your next dose is: Take 30 mL by mouth four (4) times daily for 12 days. 30 mL  
    
   
   
   
  
 doxycycline 100 mg tablet Commonly known as:  VIBRA-TABS Your last dose was: Your next dose is: Take 1 Tab by mouth every twelve (12) hours for 12 days. 100 mg  
    
   
   
   
  
 metroNIDAZOLE 500 mg tablet Commonly known as:  FLAGYL Your last dose was: Your next dose is: Take 1 Tab by mouth every eight (8) hours for 12 days. 500 mg  
    
   
   
   
  
 pantoprazole 40 mg tablet Commonly known as:  PROTONIX Your last dose was: Your next dose is: Take 1 Tab by mouth Before breakfast and dinner for 30 days. 40 mg  
    
   
   
   
  
 tamsulosin 0.4 mg capsule Commonly known as:  FLOMAX Start taking on:  2018 Your last dose was: Your next dose is: Take 1 Cap by mouth daily. 0.4 mg  
    
   
   
   
  
  
STOP taking these medications   
 aspirin 81 mg chewable tablet Where to Get Your Medications Information on where to get these meds will be given to you by the nurse or doctor. ! Ask your nurse or doctor about these medications  
  acetaminophen 500 mg tablet  
 bismuth subsalicylate 983 AV/47 mL suspension  
 doxycycline 100 mg tablet  
 metroNIDAZOLE 500 mg tablet  
 pantoprazole 40 mg tablet  
 tamsulosin 0.4 mg capsule Discharge Instructions HOSPITALIST DISCHARGE INSTRUCTIONS 
 
NAME: Moses Torres Oswald  
:  1950 MRN:  [id number] Date/Time:  2018 2:35 PM 
 
ADMIT DATE: 2018 DISCHARGE DATE: 2018 DISCHARGE DIAGNOSIS: 
Acute Blood Loss Anemia Duodenal ulcer Upper GIB H pylori Severe Sepsis on  Pulmonary Vascular Congestion on  CXR Acute Pancreatitis Orthostatic Hypotension Hematuria, Suprapubic Tenderness Mild renal insufficiency Urinary retenetion MEDICATIONS: 
· It is important that you take the medication exactly as they are prescribed. · Keep your medication in the bottles provided by the pharmacist and keep a list of the medication names, dosages, and times to be taken in your wallet. · Do not take other medications without consulting your doctor. Pain Management: per above medications What to do at [facility] Recommended diet:  Regular Diet Recommended activity: PT/OT Eval and Treat If you have questions regarding the hospital related prescriptions or hospital related issues please call Marcial Ocasio at . If you experience any of the following symptoms then please call your primary care physician or return to the emergency room if you cannot get hold of your doctor: 
Fever, chills, nausea, vomiting, diarrhea, change in mentation, falling, bleeding, shortness of breath Information obtained by : 
I understand that if any problems occur once I am at home I am to contact my physician. I understand and acknowledge receipt of the instructions indicated above. Physician's or R.N.'s Signature                                                                  Date/Time Patient or Representative Signature                                                          Date/Time BodBot Announcement We are excited to announce that we are making your provider's discharge notes available to you in BodBot. You will see these notes when they are completed and signed by the physician that discharged you from your recent hospital stay. If you have any questions or concerns about any information you see in BodBot, please call the Health Information Department where you were seen or reach out to your Primary Care Provider for more information about your plan of care. Introducing [facility]! Kari Lee introduces BodBot patient portal. Now you can access parts of your medical record, email your doctor's office, and request medication refills online. 1. In your internet browser, go to [email or web address] 2. Click on the First Time User? Click Here link in the Sign In box. You will see the New Member Sign Up page. 3. Enter your Adknowledge Access Code exactly as it appears below. You will not need to use this code after youve completed the sign-up process. If you do not sign up before the expiration date, you must request a new code. · Adknowledge Access Code: [email or web address] Expires: 5/7/2018  9:40 AM 
 
4. Enter the last four digits of your Social Security Number (xxxx) and Date of Birth (mm/dd/yyyy) as indicated and click Submit. You will be taken to the next sign-up page. 5. Create a Adknowledge ID. This will be your Adknowledge login ID and cannot be changed, so think of one that is secure and easy to remember. 6. Create a Adknowledge password. You can change your password at any time. 7. Enter your Password Reset Question and Answer. This can be used at a later time if you forget your password. 8. Enter your e-mail address. You will receive e-mail notification when new information is available in [address] 9. Click Sign Up. You can now view and download portions of your medical record. 10. Click the Download Summary menu link to download a portable copy of your medical information. If you have questions, please visit the Frequently Asked Questions section of the [employer] website. Remember, [employer]knowledge is NOT to be used for urgent needs. For medical emergencies, dial 911. Now available from your iPhone and Android! Providers Seen During Your Hospitalization Provider Specialty Primary office phone Cornelia Herrera. Francine Lindsay MD Emergency Medicine [phone] Remi Zapien MD Internal Medicine [phone] Casimiro Saleh MD Internal Medicine [phone] Your Primary Care Physician (PCP) Primary Care Physician Office Phone Office Fax Reyes Liming [phone] You are allergic to the following Allergen Reactions Pcn (Penicillins) Anaphylaxis Family history Recent Documentation Height Weight BMI Smoking Status 1.829 m 86.2 kg 25.77 kg/m2 Former Smoker Emergency Contacts Name Discharge Info Relation Home Work Mobile Joseph Leon  DISCHARGE CAREGIVER [3] Son [22]   [phone] Patient Belongings The following personal items are in your possession at time of discharge: 
  Dental Appliances: None  Visual Aid: Glasses      Home Medications: None   Jewelry: None  Clothing: With patient    Other Valuables: Cell Phone Please provide this summary of care documentation to your next provider. Signatures-by signing, you are acknowledging that this After Visit Summary has been reviewed with you and you have received a copy. Patient Signature:  ____________________________________________________________ Date:  ____________________________________________________________  
  
[facility] Provider Signature:  ____________________________________________________________ Date:  ____________________________________________________________

## 2018-02-03 ENCOUNTER — APPOINTMENT (OUTPATIENT)
Dept: ULTRASOUND IMAGING | Age: 68
DRG: 981 | End: 2018-02-03
Attending: INTERNAL MEDICINE
Payer: COMMERCIAL

## 2018-02-03 ENCOUNTER — APPOINTMENT (OUTPATIENT)
Dept: GENERAL RADIOLOGY | Age: 68
DRG: 981 | End: 2018-02-03
Attending: INTERNAL MEDICINE
Payer: COMMERCIAL

## 2018-02-03 LAB
ALBUMIN SERPL-MCNC: 2.5 G/DL (ref 3.5–5)
ALBUMIN/GLOB SERPL: 0.8 {RATIO} (ref 1.1–2.2)
ALP SERPL-CCNC: 64 U/L (ref 45–117)
ALT SERPL-CCNC: 16 U/L (ref 12–78)
ANION GAP SERPL CALC-SCNC: 8 MMOL/L (ref 5–15)
AST SERPL-CCNC: 24 U/L (ref 15–37)
BASOPHILS # BLD: 0.1 K/UL (ref 0–0.1)
BASOPHILS NFR BLD: 1 % (ref 0–1)
BILIRUB SERPL-MCNC: 0.7 MG/DL (ref 0.2–1)
BUN SERPL-MCNC: 20 MG/DL (ref 6–20)
BUN/CREAT SERPL: 18 (ref 12–20)
CALCIUM SERPL-MCNC: 7.6 MG/DL (ref 8.5–10.1)
CHLORIDE SERPL-SCNC: 113 MMOL/L (ref 97–108)
CO2 SERPL-SCNC: 22 MMOL/L (ref 21–32)
CREAT SERPL-MCNC: 1.13 MG/DL (ref 0.7–1.3)
DIFFERENTIAL METHOD BLD: ABNORMAL
EOSINOPHIL # BLD: 0.1 K/UL (ref 0–0.4)
EOSINOPHIL NFR BLD: 1 % (ref 0–7)
ERYTHROCYTE [DISTWIDTH] IN BLOOD BY AUTOMATED COUNT: 16.6 % (ref 11.5–14.5)
GLOBULIN SER CALC-MCNC: 3.1 G/DL (ref 2–4)
GLUCOSE SERPL-MCNC: 97 MG/DL (ref 65–100)
HCT VFR BLD AUTO: 29.4 % (ref 36.6–50.3)
HGB BLD-MCNC: 10.3 G/DL (ref 12.1–17)
HGB BLD-MCNC: 10.8 G/DL (ref 12.1–17)
IMM GRANULOCYTES # BLD: 0.1 K/UL (ref 0–0.04)
IMM GRANULOCYTES NFR BLD AUTO: 1 % (ref 0–0.5)
LIPASE SERPL-CCNC: 328 U/L (ref 73–393)
LYMPHOCYTES # BLD: 1.4 K/UL (ref 0.8–3.5)
LYMPHOCYTES NFR BLD: 12 % (ref 12–49)
MCH RBC QN AUTO: 32 PG (ref 26–34)
MCHC RBC AUTO-ENTMCNC: 35 G/DL (ref 30–36.5)
MCV RBC AUTO: 91.3 FL (ref 80–99)
MONOCYTES # BLD: 0.6 K/UL (ref 0–1)
MONOCYTES NFR BLD: 5 % (ref 5–13)
NEUTS SEG # BLD: 9.9 K/UL (ref 1.8–8)
NEUTS SEG NFR BLD: 82 % (ref 32–75)
NRBC # BLD: 0 K/UL (ref 0–0.01)
NRBC BLD-RTO: 0 PER 100 WBC
PLATELET # BLD AUTO: 190 K/UL (ref 150–400)
PMV BLD AUTO: 9.9 FL (ref 8.9–12.9)
POTASSIUM SERPL-SCNC: 3.7 MMOL/L (ref 3.5–5.1)
PROT SERPL-MCNC: 5.6 G/DL (ref 6.4–8.2)
RBC # BLD AUTO: 3.22 M/UL (ref 4.1–5.7)
SODIUM SERPL-SCNC: 143 MMOL/L (ref 136–145)
WBC # BLD AUTO: 12.1 K/UL (ref 4.1–11.1)

## 2018-02-03 PROCEDURE — 77010033678 HC OXYGEN DAILY

## 2018-02-03 PROCEDURE — 36415 COLL VENOUS BLD VENIPUNCTURE: CPT | Performed by: INTERNAL MEDICINE

## 2018-02-03 PROCEDURE — 77030027138 HC INCENT SPIROMETER -A

## 2018-02-03 PROCEDURE — 74011250636 HC RX REV CODE- 250/636: Performed by: EMERGENCY MEDICINE

## 2018-02-03 PROCEDURE — 85025 COMPLETE CBC W/AUTO DIFF WBC: CPT | Performed by: INTERNAL MEDICINE

## 2018-02-03 PROCEDURE — 65270000029 HC RM PRIVATE

## 2018-02-03 PROCEDURE — 51798 US URINE CAPACITY MEASURE: CPT

## 2018-02-03 PROCEDURE — 80053 COMPREHEN METABOLIC PANEL: CPT | Performed by: INTERNAL MEDICINE

## 2018-02-03 PROCEDURE — 83690 ASSAY OF LIPASE: CPT | Performed by: INTERNAL MEDICINE

## 2018-02-03 PROCEDURE — 74011000250 HC RX REV CODE- 250: Performed by: INTERNAL MEDICINE

## 2018-02-03 PROCEDURE — 74011250637 HC RX REV CODE- 250/637: Performed by: EMERGENCY MEDICINE

## 2018-02-03 PROCEDURE — C9113 INJ PANTOPRAZOLE SODIUM, VIA: HCPCS | Performed by: INTERNAL MEDICINE

## 2018-02-03 PROCEDURE — 76705 ECHO EXAM OF ABDOMEN: CPT

## 2018-02-03 PROCEDURE — 85018 HEMOGLOBIN: CPT | Performed by: INTERNAL MEDICINE

## 2018-02-03 PROCEDURE — 71045 X-RAY EXAM CHEST 1 VIEW: CPT

## 2018-02-03 PROCEDURE — 74011250637 HC RX REV CODE- 250/637: Performed by: INTERNAL MEDICINE

## 2018-02-03 PROCEDURE — 74011000258 HC RX REV CODE- 258: Performed by: INTERNAL MEDICINE

## 2018-02-03 PROCEDURE — 74011250636 HC RX REV CODE- 250/636: Performed by: INTERNAL MEDICINE

## 2018-02-03 RX ORDER — LORAZEPAM 2 MG/ML
4 INJECTION INTRAMUSCULAR
Status: DISCONTINUED | OUTPATIENT
Start: 2018-02-03 | End: 2018-02-04

## 2018-02-03 RX ORDER — ACETAMINOPHEN 500 MG
500 TABLET ORAL
Status: DISCONTINUED | OUTPATIENT
Start: 2018-02-03 | End: 2018-02-07 | Stop reason: HOSPADM

## 2018-02-03 RX ORDER — LORAZEPAM 2 MG/ML
2 INJECTION INTRAMUSCULAR
Status: DISCONTINUED | OUTPATIENT
Start: 2018-02-03 | End: 2018-02-04

## 2018-02-03 RX ADMIN — SODIUM CHLORIDE 125 ML/HR: 900 INJECTION, SOLUTION INTRAVENOUS at 02:05

## 2018-02-03 RX ADMIN — Medication 10 ML: at 15:12

## 2018-02-03 RX ADMIN — Medication 10 ML: at 05:07

## 2018-02-03 RX ADMIN — ACETAMINOPHEN 500 MG: 500 TABLET ORAL at 23:12

## 2018-02-03 RX ADMIN — MEROPENEM 1 G: 1 INJECTION, POWDER, FOR SOLUTION INTRAVENOUS at 05:06

## 2018-02-03 RX ADMIN — LORAZEPAM 2 MG: 2 INJECTION INTRAMUSCULAR; INTRAVENOUS at 23:12

## 2018-02-03 RX ADMIN — MEROPENEM 1 G: 1 INJECTION, POWDER, FOR SOLUTION INTRAVENOUS at 11:05

## 2018-02-03 RX ADMIN — PANTOPRAZOLE SODIUM 40 MG: 40 INJECTION, POWDER, FOR SOLUTION INTRAVENOUS at 08:08

## 2018-02-03 RX ADMIN — MUPIROCIN: 20 OINTMENT TOPICAL at 08:08

## 2018-02-03 NOTE — PROGRESS NOTES
Problem: Falls - Risk of  Goal: *Absence of Falls  Document [place] Fall Risk and appropriate interventions in the flowsheet.    Outcome: Progressing Towards Goal  Fall Risk Interventions:  Mobility Interventions: Bed/chair exit alarm         Medication Interventions: Bed/chair exit alarm    Elimination Interventions: Bed/chair exit alarm, Call light in reach

## 2018-02-03 NOTE — PROGRESS NOTES
GI Progress Note (for Era)  Leanne Ras Pearson. :1950 [facility]:[id number]   Prim GI: Andria Stacy MD  PCP: Ángel Tapia MD  Date/Time:  2/3/2018 11:25 AM   Assessment:   · UGI bleeding  bleeding DU  · Bleeding not able to be controlled endoscopically on ; s/p IR guided embolization of GDA on  (active bleeding seen on angiogram of GDA)  · Acute post-hemorrhagic anemia; Hgb stable now     Plan:   · Monitor for recurrent bleeding; melena is not to be unexpected for some time, this is best evidenced by hematemesis/hematochezia, hemodynamic instability  · Serial CBC's with goal Hgb >7  · Continue BID PPI  · +Taylor, will need eventual treatment of his H. Pylori  · Avoid non-essential NSAIDs     Subjective:   Pt doing ok today. BM's with old blood. No e/o recurrent bleeding. Complaint Y/N Description   Abdominal Pain     Hematemesis     Hematochezia     Melena     Constipation     Diarrhea     Dyspepsia     Dysphagia     Jaundiced     Nausea/vomiting       Review of Systems:  Symptom Y/N Comments  Symptom Y/N Comments   Fever/Chills    Chest Pain     Cough    Headaches     Sputum    Joint Pain     SOB/EVERETT    Pruritis/Rash     Tolerating Diet    Other       Could NOT obtain due to:      Objective:   VITALS:   Last 24hrs VS reviewed since prior progress note. Most recent are:  Visit Vitals    /51    Pulse 90    Temp 98.7 °F (37.1 °C)    Resp 27    Ht 6' (1.829 m)    Wt 86.2 kg (190 lb)    SpO2 100%    BMI 25.77 kg/m2       Intake/Output Summary (Last 24 hours) at 18 1125  Last data filed at 18 1000   Gross per 24 hour   Intake          4230.83 ml   Output             1875 ml   Net          2355.83 ml     PHYSICAL EXAM:  General: WD, WN. Alert, cooperative, no acute distress    HEENT: NC, Atraumatic. PERRLA, EOMI. Anicteric sclerae. Lungs:  CTA Bilaterally. No Wheezing/Rhonchi/Rales.   Heart:  Regular  rhythm,  No murmur (), No Rubs, No Gallops  Abdomen: Soft, Non distended, Non tender.  +Bowel sounds, no HSM  Extremities: No c/c/e  Neurologic:  No acute neurological distress     Lab and Radiology Data Reviewed: (see below)    Medications Reviewed: (see below)  PMH/SH reviewed - no change compared to H&P  ________________________________________________________________________  Care Plan discussed with:  Patient x   Family  x   RN x              Consultant:  checo Marc MD     Procedures: see electronic medical records for all procedures/Xrays and details which were not copied into this note but were reviewed prior to creation of Plan. LABS:  Recent Labs      02/03/18 0518 02/02/18 1956 02/02/18 0225   WBC  12.1*   --    --   11.8*   HGB  10.3*  9.7*   < >  10.0*   HCT  29.4*  28.5*   < >  31.1*   PLT  190   --    --   252    < > = values in this interval not displayed. Recent Labs      02/03/18 0518 02/02/18 0225   NA  143  137   K  3.7  4.1   CL  113*  104   CO2  22  23   BUN  20  36*   CREA  1.13  1.37*   GLU  97  135*   CA  7.6*  8.1*     Recent Labs      02/03/18 0518 02/02/18 0225   SGOT  24  16   AP  64  73   TP  5.6*  6.2*   ALB  2.5*  2.9*   GLOB  3.1  3.3   LPSE  328  775*     Recent Labs      02/02/18 0559   INR  1.1   PTP  10.6      No results for input(s): FE, TIBC, PSAT, FERR in the last 72 hours. No results found for: FOL, RBCF  Recent Labs      02/02/18 1911   PH  7.41   PCO2  31*   PO2  98     No results for input(s): CPK, CKMB in the last 72 hours.     No lab exists for component: TROPONINI  Lab Results   Component Value Date/Time    Color YELLOW/STRAW 02/02/2018 07:40 PM    Appearance CLEAR 02/02/2018 07:40 PM    Specific gravity 1.030 02/02/2018 07:40 PM    Specific gravity 1.021 02/02/2018 04:35 AM    pH (UA) 5.0 02/02/2018 07:40 PM    Protein NEGATIVE  02/02/2018 07:40 PM    Glucose NEGATIVE  02/02/2018 07:40 PM    Ketone NEGATIVE  02/02/2018 07:40 PM    Bilirubin NEGATIVE  02/02/2018 07:40 PM    Urobilinogen 0.2 02/02/2018 07:40 PM    Nitrites NEGATIVE  02/02/2018 07:40 PM    Leukocyte Esterase NEGATIVE  02/02/2018 07:40 PM    Epithelial cells FEW 02/02/2018 07:40 PM    Bacteria NEGATIVE  02/02/2018 07:40 PM    WBC 0-4 02/02/2018 07:40 PM    RBC 20-50 02/02/2018 07:40 PM       MEDICATIONS:  Current Facility-Administered Medications   Medication Dose Route Frequency    pantoprazole (PROTONIX) 40 mg in sodium chloride 0.9 % 10 mL injection  40 mg IntraVENous Q12H    0.9% sodium chloride infusion  125 mL/hr IntraVENous CONTINUOUS    sodium chloride (NS) flush 5-10 mL  5-10 mL IntraVENous Q8H    sodium chloride (NS) flush 5-10 mL  5-10 mL IntraVENous PRN    ondansetron (ZOFRAN) injection 4 mg  4 mg IntraVENous Q4H PRN    0.9% sodium chloride infusion 250 mL  250 mL IntraVENous PRN    0.9% sodium chloride infusion 250 mL  250 mL IntraVENous PRN    meropenem (MERREM) 1 g in 0.9% sodium chloride (MBP/ADV) 50 mL  1 g IntraVENous Q8H    mupirocin (BACTROBAN) 2 % ointment   Both Nostrils Q12H

## 2018-02-03 NOTE — PROGRESS NOTES
1915- Received report on Patient. Informed bladder scan was 700 mL. Patient now able to void. 0530- Bladder scan 1059 mL  0548- MD paged  0986- Spoke to Dr. Maria C simpson straight cath patient.    0700- Report to [email or web address]

## 2018-02-03 NOTE — ANESTHESIA PREPROCEDURE EVALUATION
Anesthetic History   No history of anesthetic complications            Review of Systems / Medical History  Patient summary reviewed, nursing notes reviewed and pertinent labs reviewed    Pulmonary  Within defined limits                 Neuro/Psych   Within defined limits           Cardiovascular  Within defined limits                Exercise tolerance: <4 METS     GI/Hepatic/Renal         Renal disease: ARF       Endo/Other        Anemia     Other Findings   Comments: Upper GIB    Acute Pancreatitis/ETOH abuse           Physical Exam    Airway  Mallampati: II  TM Distance: > 6 cm  Neck ROM: normal range of motion   Mouth opening: Normal     Cardiovascular  Regular rate and rhythm,  S1 and S2 normal,  no murmur, click, rub, or gallop             Dental    Dentition: Poor dentition  Comments: Several missing teeth, moderate decay   Pulmonary  Breath sounds clear to auscultation               Abdominal  GI exam deferred       Other Findings            Anesthetic Plan    ASA: 3  Anesthesia type: total IV anesthesia and MAC          Induction: Intravenous  Anesthetic plan and risks discussed with: Patient

## 2018-02-03 NOTE — PROGRESS NOTES
Pharmacy Automatic Renal Dosing Protocol - Antimicrobials    Indication for Antimicrobials: sepsis after massive GI bleed, pancreatitis per GI, possible aspiration pneumonia   And anaphylactic penicillin allergy    Current Regimen of Each Antimicrobial:  Meropenem 1 gram IV q 8 hr  (Start Date ; Day # 1)    Previous Antimicrobial Therapy:  None      Significant Cultures:   -paired blood cultures-pending      Paralysis, amputations, malnutrition: not noted    Labs:  Recent Labs      18   0225   CREA  1.37*   BUN  36*   WBC  11.8*     Temp (24hrs), Av.4 °F (37.4 °C), Min:98.1 °F (36.7 °C), Max:102 °F (38.9 °C)      Creatinine Clearance (mL/min) or Dialysis: 57  No results found for: PCT    Impression/Plan: Meropenem dose is appropriate for renal function. Discussed with Dr. Griselda Miller whether we could use something like Cefepime and Metronidazole. Dr. Griselda Miller does not want to change at this time as order was not written by him. Will process order tonight. Reassess tomorrow once there is better information available and attending physician is available     Pharmacy will follow daily and adjust medications as appropriate for renal function and/or serum levels.     Thank you,  Po Velasco, PHARMD

## 2018-02-03 NOTE — PROGRESS NOTES
0700-Bedside and Verbal shift change report given to Brendon Collazo RN (oncoming nurse) by Sammy Garzon RN (offgoing nurse). Report included the following information SBAR, Kardex, ED Summary, Procedure Summary, Intake/Output, MAR, Recent Results and Cardiac Rhythm NSR.     0830-NGT removed and clear liquid diet per Dr. Erika Thompson who spoke with Dr. Mauricio Zhu on the phone. Dr. Mauricio Zhu placed the order in the computer. 0900-Patient up to toilet with 1 person assistance. Has a medium loose maroon BM and assisted to chair for clear liquid breakfast.      0915-Dr. Erika Thompson at bedside. Okay with a PCU transfer. 1000-Patient up to toilet for another medium loose maroon BM. Assisted back to bed (patient verbalized he was \"worn out\")  Able to eat his entire clear liquid breakfast tray with out any nausea or difficulty. No signs of active bleeding. 1100-Patient up and down to toilet with frequent loose maroon BMs. No active bleeding seen. VS WDL. Next scheduled Hgb at 1300 today. 1400-New Hgb 10.8.

## 2018-02-03 NOTE — ANESTHESIA POSTPROCEDURE EVALUATION
Post-Anesthesia Evaluation and Assessment    Patient: Melissa Morrissey Sr. MRN: [id number]  SSN: [id number]    YOB: 1950  Age: 76 y.o. Sex: male       Cardiovascular Function/Vital Signs  Visit Vitals    /82    Pulse (!) 118    Temp (!) 38.9 °C (102 °F)    Resp 26    Ht 6' (1.829 m)    Wt 86.2 kg (190 lb)    SpO2 97%    BMI 25.77 kg/m2       Patient is status post total IV anesthesia, MAC anesthesia for * No procedures listed *. Nausea/Vomiting: None    Postoperative hydration reviewed and adequate. Pain:  Pain Scale 1: Numeric (0 - 10) (02/02/18 4374)  Pain Intensity 1: 0 (02/02/18 1546)   Managed    Neurological Status: At baseline    Mental Status and Level of Consciousness: Arousable    Pulmonary Status:   O2 Device: CO2 nasal cannula (02/02/18 1810)   Adequate oxygenation and airway patent    Complications related to anesthesia: None    Post-anesthesia assessment completed.  No concerns    Signed By: Dereck Bland MD     February 2, 2018

## 2018-02-03 NOTE — PROGRESS NOTES
Hospitalist Progress Note    NAME: Otilio Jones Sr.   :  1950   MRN:  [id number]       Assessment / Plan:  Acute Blood Loss Anemia  Duodenal ulcer s/p EGD with Clipping, epi and APC and subsequent embolization in IR on   Upper GIB with Coffee Ground Emesis and Melena  -s/p 3 units pRBC's on , most recent Hgb 10.3  -continue maintenance NS at 125 mL/hr  -Continue IV Protonix q12h  -continue q8H Hgb checks  -continue O2  -up with assistance  -Case discussed with Dr. Mary Stevenson, will discontinue NG and start clears  -transfer to stepdown    Severe Sepsis on   -Lactate improved  -PNA ruled  -await BCx's-->question if patient became bacteremic as a result of seeding his blood stream with anaerobes via his severe bleeding duodenal ulcer  -continue meropenem for now (PCN allergy)    Acute Pancreatitis: Drinks one bottle of wine nightly  -Lipase improving  -RUQ ultrasound for completeness (GB unremarkable on CT), LFT's normal     Orthostatic Hypotension:  -should be resolved, will monitor    Hematuria, Suprapubic Tenderness   Mild renal insufficiency; baseline renal function unknown; improved  Urinary retenetion  -monitor renal function  -bladder checks prn     Code Status: Full  Surrogate Decision Maker: Son     DVT Prophylaxis: SCDs     Body mass index is 25.77 kg/(m^2). Subjective:     Chief Complaint / Reason for Physician Visit: follow-up GIB  Patient feeling better, only complains of NG pain, 150 mL out overnight, case discussed with RN    Review of Systems:  Symptom Y/N Comments  Symptom Y/N Comments   Fever/Chills y Last pm to 102  Chest Pain n    Poor Appetite    Edema n    Cough    Abdominal Pain     Sputum    Joint Pain     SOB/EVERETT n   Pruritis/Rash     Nausea/vomit n   Tolerating PT/OT     Diarrhea n   Tolerating Diet     Constipation    Other n melena     Could NOT obtain due to:      Objective:     VITALS:   Last 24hrs VS reviewed since prior progress note.  Most recent are:  Patient Vitals for the past 24 hrs:   Temp Pulse Resp BP SpO2   02/03/18 0700 - 73 15 112/45 -   02/03/18 0600 - 88 27 111/65 98 %   02/03/18 0500 - 90 26 144/66 99 %   02/03/18 0400 99.2 °F (37.3 °C) 90 18 126/61 99 %   02/03/18 0300 - 89 23 110/53 97 %   02/03/18 0200 99.6 °F (37.6 °C) (!) 104 21 134/54 98 %   02/03/18 0100 - (!) 111 27 125/54 98 %   02/03/18 0000 99.9 °F (37.7 °C) (!) 105 27 115/48 97 %   02/02/18 2300 - (!) 104 18 (!) 122/39 99 %   02/02/18 2230 - (!) 105 17 139/45 97 %   02/02/18 2200 - 95 18 102/45 96 %   02/02/18 2115 100.1 °F (37.8 °C) - - - -   02/02/18 2100 - (!) 106 19 105/48 99 %   02/02/18 2045 (!) 102 °F (38.9 °C) (!) 111 8 120/54 100 %   02/02/18 2030 - (!) 105 19 100/43 100 %   02/02/18 2000 100.2 °F (37.9 °C) (!) 113 10 95/42 99 %   02/02/18 1945 - (!) 111 22 109/49 100 %   02/02/18 1930 - (!) 121 26 96/48 100 %   02/02/18 1915 - (!) 119 27 94/53 99 %   02/02/18 1810 (!) 102 °F (38.9 °C) (!) 118 26 155/82 97 %   02/02/18 1754 98.7 °F (37.1 °C) (!) 133 24 (!) 127/94 -   02/02/18 1743 98.9 °F (37.2 °C) - - - -   02/02/18 1644 - - 24 112/67 100 %   02/02/18 1643 - - 22 111/59 100 %   02/02/18 1639 99.2 °F (37.3 °C) (!) 101 23 109/48 100 %   02/02/18 1615 - - 24 99/49 100 %   02/02/18 1600 99.8 °F (37.7 °C) 92 20 91/57 100 %   02/02/18 1553 99.6 °F (37.6 °C) 93 23 92/58 100 %   02/02/18 1546 99.6 °F (37.6 °C) 94 23 94/48 100 %   02/02/18 1545 - - 25 (!) 84/49 100 %   02/02/18 1536 99.7 °F (37.6 °C) 80 20 94/51 100 %   02/02/18 1521 99.7 °F (37.6 °C) 93 23 97/52 100 %   02/02/18 1502 99.4 °F (37.4 °C) 95 25 93/51 100 %   02/02/18 1426 99.4 °F (37.4 °C) 93 24 90/53 96 %   02/02/18 1008 98.8 °F (37.1 °C) 95 16 96/57 96 %   02/02/18 0746 98.6 °F (37 °C) (!) 103 16 117/48 97 %       Intake/Output Summary (Last 24 hours) at 02/03/18 0730  Last data filed at 02/03/18 0700   Gross per 24 hour   Intake          3855.83 ml   Output             1825 ml   Net          2030.83 ml        PHYSICAL EXAM:  General: WD, WN. Alert, cooperative, no acute distress    EENT:  EOMI. Anicteric sclerae. MM dry  Resp:  CTA bilaterally, no wheezing or rales. No accessory muscle use  CV:  RRR,  No edema  GI:  Soft, less distended, non-tender. Bowel sounds present  Neurologic:  Alert and oriented X 3, normal speech,   Psych:   Good insight. Not anxious nor agitated  Skin:  No rashes. No jaundice. Pale    Reviewed most current lab test results and cultures  YES  Reviewed most current radiology test results   YES  Review and summation of old records today    NO  Reviewed patient's current orders and MAR    YES  PMH/SH reviewed - no change compared to H&P  ________________________________________________________________________  Care Plan discussed with:    Comments   Patient x    Family   Attempted to call son, no answer   RN x    Care Manager     Consultant  x GI                     Multidiciplinary team rounds were held today with , nursing, pharmacist and clinical coordinator. Patient's plan of care was discussed; medications were reviewed and discharge planning was addressed. ________________________________________________________________________  Total NON critical care TIME:  35    Total CRITICAL CARE TIME Spent:  Minutes non procedure based      Comments   >50% of visit spent in counseling and coordination of care x    ________________________________________________________________________  Fabienne Smalls MD     Procedures: see electronic medical records for all procedures/Xrays and details which were not copied into this note but were reviewed prior to creation of Plan. LABS:  I reviewed today's most current labs and imaging studies.   Pertinent labs include:  Recent Labs      02/03/18   0518  02/02/18   1956  02/02/18   1039  02/02/18   0225   WBC  12.1*   --    --   11.8*   HGB  10.3*  9.7*  6.9*  10.0*   HCT  29.4*  28.5*  21.6*  31.1*   PLT  190   --    --   252     Recent Labs 02/03/18   0518  02/02/18   0559  02/02/18   0225   NA  143   --   137   K  3.7   --   4.1   CL  113*   --   104   CO2  22   --   23   GLU  97   --   135*   BUN  20   --   36*   CREA  1.13   --   1.37*   CA  7.6*   --   8.1*   ALB  2.5*   --   2.9*   TBILI  0.7   --   0.3   SGOT  24   --   16   ALT  16   --   20   INR   --   1.1   --        Signed: Thalia Aguilar MD

## 2018-02-03 NOTE — PROGRESS NOTES
[facility]  Pulmonary, Critical Care, and Sleep Medicine    Name: Raman Carson. MRN: [id number]   : 1950 Hospital: [facility]   Date: 2/3/2018        Critical Care Initial Patient Consult    IMPRESSION:   · Aspiration of blood, stomach contents. Suspect aspiration Pneumonia versus pneumonitis. Pt has left lower lobe infiltrate. · Upper GI bleeding  · Duodenal Ulcer, with evidence of acute bleeding. Had to go to  IR guided for  embolization of GDA on 18. Was seen on angiogram. Could not be controlled endoscopically. · Acute blood loss Anemia, was severe hgb was noted to be 6.9 now 10.3. Was transfused with 3 units. · Candida Esophagitis  · Acutely ill, moderate to high risk of decompensation. Will need IV abx for at least 24 hrs, Will need repeat CXR imaging in am. GI is following. RECOMMENDATIONS:   · Will continue current abx. Will most likely need a total of 5 day course. · Can follow Serial CXRs. · Serial labs  · Gi is following. · On PPI  Twice daily. · STable for transfer to [address] [place] [address]. Subjective/History: This patient has been seen and evaluated at the request of Dr. Misty Puri for above. Patient is a 76 y.o. male who  Underwent endoscopy last pm for upper GI bleeding. Pt seen this am. No chest pain, no back pain, no abdominal pain. Denies any leg pain. Has been breathing comfortably. NO acute HA, no vision changes. History reviewed. No pertinent past medical history. Past Surgical History:   Procedure Laterality Date    HX COLONOSCOPY N/A 2013    HX PROSTATECTOMY      Bx 2018      Prior to Admission medications    Medication Sig Start Date End Date Taking? Authorizing Provider   aspirin 81 mg chewable tablet Take 81 mg by mouth daily.    Yes Historical Provider     Current Facility-Administered Medications   Medication Dose Route Frequency    pantoprazole (PROTONIX) 40 mg in sodium chloride 0.9 % 10 mL injection  40 mg IntraVENous Q12H    0.9% sodium chloride infusion  125 mL/hr IntraVENous CONTINUOUS    sodium chloride (NS) flush 5-10 mL  5-10 mL IntraVENous Q8H    meropenem (MERREM) 1 g in 0.9% sodium chloride (MBP/ADV) 50 mL  1 g IntraVENous Q8H    mupirocin (BACTROBAN) 2 % ointment   Both Nostrils Q12H     Allergies   Allergen Reactions    Pcn [Penicillins] Anaphylaxis     Family history      Social History   Substance Use Topics    Smoking status: Former Smoker     Packs/day: 1.00     Quit date: 2012    Smokeless tobacco: Current User    Alcohol use Not on file      History reviewed. No pertinent family history.      Review of Systems:  Constitutional: negative  Eyes: negative  Ears, nose, mouth, throat, and face: negative  Respiratory: positive for cough  Cardiovascular: negative  Gastrointestinal: positive for reflux symptoms, vomiting and melena  Genitourinary:negative  Integument/breast: negative  Hematologic/lymphatic: negative  Musculoskeletal:negative  Neurological: negative  Behavioral/Psych: negative  Endocrine: negative  Allergic/Immunologic: negative    Objective:   Vital Signs:    Visit Vitals    /51    Pulse 90    Temp 98.7 °F (37.1 °C)    Resp 27    Ht 6' (1.829 m)    Wt 86.2 kg (190 lb)    SpO2 100%    BMI 25.77 kg/m2       O2 Device: Nasal cannula   O2 Flow Rate (L/min): 2 l/min   Temp (24hrs), Av.8 °F (37.7 °C), Min:98.7 °F (37.1 °C), Max:102 °F (38.9 °C)       Intake/Output:   Last shift:       07 -  1900  In: 375 [I.V.:375]  Out: 50   Last 3 shifts:  190 -  0700  In: 3855.8 [I.V.:3545.8]  Out: 6917 [Urine:1675]    Intake/Output Summary (Last 24 hours) at 18 1344  Last data filed at 18 1000   Gross per 24 hour   Intake          4230.83 ml   Output             1875 ml   Net          2355.83 ml     Hemodynamics:   PAP:   CO:     Wedge:   CI:     CVP:    SVR:       PVR:       Ventilator Settings:  Mode Rate Tidal Volume Pressure FiO2 PEEP            50 %       Peak airway pressure:      Minute ventilation:        Physical Exam:    General:  Alert, cooperative, no distress, appears stated age. Head:  Normocephalic, without obvious abnormality, atraumatic. Eyes:  Conjunctivae/corneas clear. PERRL, EOMs intact. Nose: Nares normal. Septum midline. Mucosa normal. No drainage or sinus tenderness. Throat: Lips, mucosa, and tongue normal. Teeth and gums normal.   Neck: Supple, symmetrical, trachea midline, no adenopathy, thyroid: no enlargment/tenderness/nodules, no carotid bruit and no JVD. Back:   Symmetric, no curvature. ROM normal.   Lungs:   Clear to auscultation bilaterally. Breathing comfortably. Chest wall:  No tenderness or deformity. Heart:  Regular rate and rhythm, S1, S2 normal, no murmur, click, rub or gallop. Abdomen:   Soft, non-tender. Bowel sounds normal. No masses,  No organomegaly. Extremities: Extremities normal, atraumatic, no cyanosis or edema. Pulses: 2+ and symmetric all extremities. Skin: Skin color, texture, turgor normal. No rashes or lesions   Lymph nodes: Cervical, supraclavicular, and axillary nodes normal.   Neurologic: Grossly nonfocal Psych: Appropriate. No anxiety , no depression.         Data:     Recent Results (from the past 24 hour(s))   POC H. PYLORI, TISSUE    Collection Time: 02/02/18  4:24 PM   Result Value Ref Range    H. pylori from tissue Positive Negative    Positive control Positive     Negative control Negative     Lot no. [id number]    BLOOD GAS, ARTERIAL    Collection Time: 02/02/18  7:11 PM   Result Value Ref Range    pH 7.41 7.35 - 7.45      PCO2 31 (L) 35.0 - 45.0 mmHg    PO2 98 80 - 100 mmHg    O2 SAT 98 (H) 92 - 97 %    BICARBONATE 19 (L) 22 - 26 mmol/L    BASE DEFICIT 4.0 mmol/L    O2 METHOD VENTURI MASK      O2 FLOW RATE 12.00 L/min    FIO2 50 %    SPONTANEOUS RATE 28.0      Sample source ARTERIAL      SITE RIGHT RADIAL      RADHA'S TEST YES     URINALYSIS W/ REFLEX CULTURE Collection Time: 02/02/18  7:40 PM   Result Value Ref Range    Color YELLOW/STRAW      Appearance CLEAR CLEAR      Specific gravity 1.030 1.003 - 1.030      pH (UA) 5.0 5.0 - 8.0      Protein NEGATIVE  NEG mg/dL    Glucose NEGATIVE  NEG mg/dL    Ketone NEGATIVE  NEG mg/dL    Bilirubin NEGATIVE  NEG      Blood LARGE (A) NEG      Urobilinogen 0.2 0.2 - 1.0 EU/dL    Nitrites NEGATIVE  NEG      Leukocyte Esterase NEGATIVE  NEG      UA:UC IF INDICATED CULTURE NOT INDICATED BY UA RESULT CNI      WBC 0-4 0 - 4 /hpf    RBC 20-50 0 - 5 /hpf    Epithelial cells FEW FEW /lpf    Bacteria NEGATIVE  NEG /hpf    Hyaline cast 0-2 0 - 5 /lpf   HGB & HCT    Collection Time: 02/02/18  7:56 PM   Result Value Ref Range    HGB 9.7 (L) 12.1 - 17.0 g/dL    HCT 28.5 (L) 36.6 - 50.3 %   CULTURE, BLOOD, PAIRED    Collection Time: 02/02/18  7:56 PM   Result Value Ref Range    Special Requests: NO SPECIAL REQUESTS      Culture result: NO GROWTH AFTER 12 HOURS     LACTIC ACID    Collection Time: 02/02/18  8:08 PM   Result Value Ref Range    Lactic acid 1.3 0.4 - 2.0 MMOL/L   METABOLIC PANEL, COMPREHENSIVE    Collection Time: 02/03/18  5:18 AM   Result Value Ref Range    Sodium 143 136 - 145 mmol/L    Potassium 3.7 3.5 - 5.1 mmol/L    Chloride 113 (H) 97 - 108 mmol/L    CO2 22 21 - 32 mmol/L    Anion gap 8 5 - 15 mmol/L    Glucose 97 65 - 100 mg/dL    BUN 20 6 - 20 MG/DL    Creatinine 1.13 0.70 - 1.30 MG/DL    BUN/Creatinine ratio 18 12 - 20      GFR est AA >60 >60 ml/min/1.73m2    GFR est non-AA >60 >60 ml/min/1.73m2    Calcium 7.6 (L) 8.5 - 10.1 MG/DL    Bilirubin, total 0.7 0.2 - 1.0 MG/DL    ALT (SGPT) 16 12 - 78 U/L    AST (SGOT) 24 15 - 37 U/L    Alk.  phosphatase 64 45 - 117 U/L    Protein, total 5.6 (L) 6.4 - 8.2 g/dL    Albumin 2.5 (L) 3.5 - 5.0 g/dL    Globulin 3.1 2.0 - 4.0 g/dL    A-G Ratio 0.8 (L) 1.1 - 2.2     CBC WITH AUTOMATED DIFF    Collection Time: 02/03/18  5:18 AM   Result Value Ref Range    WBC 12.1 (H) 4.1 - 11.1 K/uL RBC 3.22 (L) 4.10 - 5.70 M/uL    HGB 10.3 (L) 12.1 - 17.0 g/dL    HCT 29.4 (L) 36.6 - 50.3 %    MCV 91.3 80.0 - 99.0 FL    MCH 32.0 26.0 - 34.0 PG    MCHC 35.0 30.0 - 36.5 g/dL    RDW 16.6 (H) 11.5 - 14.5 %    PLATELET 027 946 - 847 K/uL    MPV 9.9 8.9 - 12.9 FL    NRBC 0.0 0  WBC    ABSOLUTE NRBC 0.00 0.00 - 0.01 K/uL    NEUTROPHILS 82 (H) 32 - 75 %    LYMPHOCYTES 12 12 - 49 %    MONOCYTES 5 5 - 13 %    EOSINOPHILS 1 0 - 7 %    BASOPHILS 1 0 - 1 %    IMMATURE GRANULOCYTES 1 (H) 0.0 - 0.5 %    ABS. NEUTROPHILS 9.9 (H) 1.8 - 8.0 K/UL    ABS. LYMPHOCYTES 1.4 0.8 - 3.5 K/UL    ABS. MONOCYTES 0.6 0.0 - 1.0 K/UL    ABS. EOSINOPHILS 0.1 0.0 - 0.4 K/UL    ABS. BASOPHILS 0.1 0.0 - 0.1 K/UL    ABS. IMM. GRANS. 0.1 (H) 0.00 - 0.04 K/UL    DF AUTOMATED     LIPASE    Collection Time: 02/03/18  5:18 AM   Result Value Ref Range    Lipase 328 73 - 393 U/L             Telemetry:normal sinus rhythm    Imaging:  I have personally reviewed the patients radiographs and have reviewed the reports:  2/3/18: Has as left lower lobe infiltrate.         Brian Cole MD

## 2018-02-04 ENCOUNTER — APPOINTMENT (OUTPATIENT)
Dept: GENERAL RADIOLOGY | Age: 68
DRG: 981 | End: 2018-02-04
Attending: INTERNAL MEDICINE
Payer: COMMERCIAL

## 2018-02-04 LAB
ALBUMIN SERPL-MCNC: 2.1 G/DL (ref 3.5–5)
ALBUMIN/GLOB SERPL: 0.8 {RATIO} (ref 1.1–2.2)
ALP SERPL-CCNC: 53 U/L (ref 45–117)
ALT SERPL-CCNC: 13 U/L (ref 12–78)
ANION GAP SERPL CALC-SCNC: 5 MMOL/L (ref 5–15)
AST SERPL-CCNC: 22 U/L (ref 15–37)
BASOPHILS # BLD: 0 K/UL (ref 0–0.1)
BASOPHILS NFR BLD: 0 % (ref 0–1)
BILIRUB SERPL-MCNC: 0.5 MG/DL (ref 0.2–1)
BUN SERPL-MCNC: 14 MG/DL (ref 6–20)
BUN/CREAT SERPL: 12 (ref 12–20)
CALCIUM SERPL-MCNC: 7.9 MG/DL (ref 8.5–10.1)
CHLORIDE SERPL-SCNC: 116 MMOL/L (ref 97–108)
CO2 SERPL-SCNC: 25 MMOL/L (ref 21–32)
CREAT SERPL-MCNC: 1.14 MG/DL (ref 0.7–1.3)
DIFFERENTIAL METHOD BLD: ABNORMAL
EOSINOPHIL # BLD: 0.2 K/UL (ref 0–0.4)
EOSINOPHIL NFR BLD: 2 % (ref 0–7)
ERYTHROCYTE [DISTWIDTH] IN BLOOD BY AUTOMATED COUNT: 16.1 % (ref 11.5–14.5)
GLOBULIN SER CALC-MCNC: 2.5 G/DL (ref 2–4)
GLUCOSE SERPL-MCNC: 96 MG/DL (ref 65–100)
HCT VFR BLD AUTO: 23 % (ref 36.6–50.3)
HCT VFR BLD AUTO: 24.5 % (ref 36.6–50.3)
HGB BLD-MCNC: 7.8 G/DL (ref 12.1–17)
HGB BLD-MCNC: 8.2 G/DL (ref 12.1–17)
IMM GRANULOCYTES # BLD: 0 K/UL (ref 0–0.04)
IMM GRANULOCYTES NFR BLD AUTO: 1 % (ref 0–0.5)
LYMPHOCYTES # BLD: 1.3 K/UL (ref 0.8–3.5)
LYMPHOCYTES NFR BLD: 15 % (ref 12–49)
MCH RBC QN AUTO: 31.2 PG (ref 26–34)
MCHC RBC AUTO-ENTMCNC: 33.9 G/DL (ref 30–36.5)
MCV RBC AUTO: 92 FL (ref 80–99)
MONOCYTES # BLD: 1 K/UL (ref 0–1)
MONOCYTES NFR BLD: 11 % (ref 5–13)
NEUTS SEG # BLD: 6 K/UL (ref 1.8–8)
NEUTS SEG NFR BLD: 71 % (ref 32–75)
NRBC # BLD: 0 K/UL (ref 0–0.01)
NRBC BLD-RTO: 0 PER 100 WBC
PLATELET # BLD AUTO: 182 K/UL (ref 150–400)
PMV BLD AUTO: 9.7 FL (ref 8.9–12.9)
POTASSIUM SERPL-SCNC: 3.6 MMOL/L (ref 3.5–5.1)
PROT SERPL-MCNC: 4.6 G/DL (ref 6.4–8.2)
RBC # BLD AUTO: 2.5 M/UL (ref 4.1–5.7)
SODIUM SERPL-SCNC: 146 MMOL/L (ref 136–145)
WBC # BLD AUTO: 8.5 K/UL (ref 4.1–11.1)

## 2018-02-04 PROCEDURE — C9113 INJ PANTOPRAZOLE SODIUM, VIA: HCPCS | Performed by: INTERNAL MEDICINE

## 2018-02-04 PROCEDURE — 74011250636 HC RX REV CODE- 250/636: Performed by: INTERNAL MEDICINE

## 2018-02-04 PROCEDURE — 51798 US URINE CAPACITY MEASURE: CPT

## 2018-02-04 PROCEDURE — 74011000258 HC RX REV CODE- 258: Performed by: INTERNAL MEDICINE

## 2018-02-04 PROCEDURE — 97161 PT EVAL LOW COMPLEX 20 MIN: CPT

## 2018-02-04 PROCEDURE — 74011250637 HC RX REV CODE- 250/637: Performed by: EMERGENCY MEDICINE

## 2018-02-04 PROCEDURE — 74011250637 HC RX REV CODE- 250/637: Performed by: INTERNAL MEDICINE

## 2018-02-04 PROCEDURE — G8978 MOBILITY CURRENT STATUS: HCPCS

## 2018-02-04 PROCEDURE — 65270000029 HC RM PRIVATE

## 2018-02-04 PROCEDURE — 97165 OT EVAL LOW COMPLEX 30 MIN: CPT

## 2018-02-04 PROCEDURE — 97535 SELF CARE MNGMENT TRAINING: CPT

## 2018-02-04 PROCEDURE — G8987 SELF CARE CURRENT STATUS: HCPCS

## 2018-02-04 PROCEDURE — G8979 MOBILITY GOAL STATUS: HCPCS

## 2018-02-04 PROCEDURE — 36415 COLL VENOUS BLD VENIPUNCTURE: CPT | Performed by: INTERNAL MEDICINE

## 2018-02-04 PROCEDURE — 85014 HEMATOCRIT: CPT | Performed by: INTERNAL MEDICINE

## 2018-02-04 PROCEDURE — 80053 COMPREHEN METABOLIC PANEL: CPT | Performed by: INTERNAL MEDICINE

## 2018-02-04 PROCEDURE — 97116 GAIT TRAINING THERAPY: CPT

## 2018-02-04 PROCEDURE — G8988 SELF CARE GOAL STATUS: HCPCS

## 2018-02-04 PROCEDURE — 71046 X-RAY EXAM CHEST 2 VIEWS: CPT

## 2018-02-04 PROCEDURE — 74011000250 HC RX REV CODE- 250: Performed by: INTERNAL MEDICINE

## 2018-02-04 PROCEDURE — 85025 COMPLETE CBC W/AUTO DIFF WBC: CPT | Performed by: INTERNAL MEDICINE

## 2018-02-04 RX ORDER — METRONIDAZOLE 250 MG/1
500 TABLET ORAL EVERY 8 HOURS
Status: DISCONTINUED | OUTPATIENT
Start: 2018-02-04 | End: 2018-02-07 | Stop reason: HOSPADM

## 2018-02-04 RX ORDER — TAMSULOSIN HYDROCHLORIDE 0.4 MG/1
0.4 CAPSULE ORAL DAILY
Status: DISCONTINUED | OUTPATIENT
Start: 2018-02-04 | End: 2018-02-07 | Stop reason: HOSPADM

## 2018-02-04 RX ORDER — LEVOFLOXACIN 750 MG/1
750 TABLET ORAL EVERY 24 HOURS
Status: DISCONTINUED | OUTPATIENT
Start: 2018-02-04 | End: 2018-02-05

## 2018-02-04 RX ADMIN — PANTOPRAZOLE SODIUM 40 MG: 40 INJECTION, POWDER, FOR SOLUTION INTRAVENOUS at 22:39

## 2018-02-04 RX ADMIN — PANTOPRAZOLE SODIUM 40 MG: 40 INJECTION, POWDER, FOR SOLUTION INTRAVENOUS at 09:53

## 2018-02-04 RX ADMIN — MEROPENEM 1 G: 1 INJECTION, POWDER, FOR SOLUTION INTRAVENOUS at 01:34

## 2018-02-04 RX ADMIN — MEROPENEM 1 G: 1 INJECTION, POWDER, FOR SOLUTION INTRAVENOUS at 06:16

## 2018-02-04 RX ADMIN — METRONIDAZOLE 500 MG: 250 TABLET ORAL at 22:39

## 2018-02-04 RX ADMIN — LEVOFLOXACIN 750 MG: 750 TABLET, FILM COATED ORAL at 13:20

## 2018-02-04 RX ADMIN — METRONIDAZOLE 500 MG: 250 TABLET ORAL at 13:20

## 2018-02-04 RX ADMIN — MUPIROCIN: 20 OINTMENT TOPICAL at 21:00

## 2018-02-04 RX ADMIN — Medication 10 ML: at 13:21

## 2018-02-04 RX ADMIN — TAMSULOSIN HYDROCHLORIDE 0.4 MG: 0.4 CAPSULE ORAL at 15:19

## 2018-02-04 RX ADMIN — MUPIROCIN: 20 OINTMENT TOPICAL at 09:00

## 2018-02-04 RX ADMIN — PANTOPRAZOLE SODIUM 40 MG: 40 INJECTION, POWDER, FOR SOLUTION INTRAVENOUS at 01:35

## 2018-02-04 NOTE — PROGRESS NOTES
Hospitalist Progress Note    NAME: Sofía Ta Sr.   :  1950   MRN:  [id number]       Assessment / Plan:  Acute Blood Loss Anemia  Duodenal ulcer s/p EGD with Clipping, epi and APC and subsequent embolization in IR on   Upper GIB with Coffee Ground Emesis and Melena  -continue telemetry  -s/p 3 units pRBC's on , most recent Hgb 8.2 (was 7.8 earlier this am); check again tomorrow morning  -discontinue maintenance IVFs  -Continue IV Protonix q12h for 72 hours before transitioning to PO PPI per my discussion with Dr. Edith Fontenot on   -check Hgb again tomorrow  -wean O2 as tolerated (never hypoxia, was placed for anemia)  -up with assistance; PT/OT ordered  -GI lite started    Severe Sepsis on   -Lactate improved  -PNA ruled out; today's CXR independently reviewed with patients james is an Orthopedic PA  -BCx's with ND 2 d thus will de-escalate empiric meropenem (PCN allergy) to Levaquin/Flagyl (both PO)    Pulmonary Vascular Congestion on  CXR  -stop IVF's  -hold lasix since BP is the lower limit of normal    Acute Pancreatitis: Drinks one bottle of wine nightly  -Lipase improved  -RUQ ultrasound only with hpatic steatosis, LFT's normal     Orthostatic Hypotension:  -should be resolved, will monitor    Hematuria, Suprapubic Tenderness  Mild renal insufficiency; baseline renal function unknown; improved  Urinary retenetion  -monitor renal function  -Winslow placed last night, Urology consult per patient/family request  -start Flomax     Code Status: Full  Surrogate Decision Maker: Son     DVT Prophylaxis: SCDs     Body mass index is 25.77 kg/(m^2).         Subjective:     Chief Complaint / Reason for Physician Visit: follow-up GIB  Patient denies pain  + cough  Denies SOB    Review of Systems:  Symptom Y/N Comments  Symptom Y/N Comments   Fever/Chills n improved  Chest Pain n    Poor Appetite    Edema n    Cough    Abdominal Pain     Sputum    Joint Pain     SOB/EVERETT n   Pruritis/Rash     Nausea/vomit n Tolerating PT/OT     Diarrhea n No BM  Tolerating Diet     Constipation    Other       Could NOT obtain due to:      Objective:     VITALS:   Last 24hrs VS reviewed since prior progress note. Most recent are:  Patient Vitals for the past 24 hrs:   Temp Pulse Resp BP SpO2   02/04/18 1149 98 °F (36.7 °C) 78 16 98/48 99 %   02/04/18 0739 98.5 °F (36.9 °C) 84 16 103/56 99 %   02/04/18 0416 98.3 °F (36.8 °C) 76 18 95/56 97 %   02/03/18 2055 98.2 °F (36.8 °C) 87 26 142/66 93 %   02/03/18 1953 - 85 (!) 31 125/67 -   02/03/18 1951 - 87 27 - 96 %   02/03/18 1614 98.6 °F (37 °C) 91 25 111/43 -   02/03/18 1418 - (!) 106 - 150/49 -   02/03/18 1359 - (!) 104 26 135/80 -       Intake/Output Summary (Last 24 hours) at 02/04/18 1329  Last data filed at 02/04/18 1211   Gross per 24 hour   Intake              250 ml   Output             3540 ml   Net            -3290 ml        PHYSICAL EXAM:  General: WD, WN. Alert, cooperative, no acute distress    EENT:  EOMI. Anicteric sclerae. MMM  Resp:  CTA bilaterally, no wheezing or rales. No accessory muscle use but frequent coughing  CV:  RRR,  No edema  GI:  Soft, less distended, non-tender. Bowel sounds present  Neurologic:  Alert and oriented X 3, normal speech,   Psych:   Good insight. Not anxious nor agitated  Skin:  No rashes. No jaundice. Pale    Reviewed most current lab test results and cultures  YES  Reviewed most current radiology test results   YES  Review and summation of old records today    NO  Reviewed patient's current orders and MAR    YES  PMH/SH reviewed - no change compared to H&P  ________________________________________________________________________  Care Plan discussed with:    Comments   Patient x    Family  x    RN x    Care Manager     Consultant  x Dr. Di John team rounds were held today with , nursing, pharmacist and clinical coordinator.   Patient's plan of care was discussed; medications were reviewed and discharge planning was addressed. ________________________________________________________________________  Total NON critical care TIME:  35    Total CRITICAL CARE TIME Spent:  Minutes non procedure based      Comments   >50% of visit spent in counseling and coordination of care x Up dating son, adressing all concerns, discussion with RN and GI   ________________________________________________________________________  Mauricio Sandoval MD     Procedures: see electronic medical records for all procedures/Xrays and details which were not copied into this note but were reviewed prior to creation of Plan. LABS:  I reviewed today's most current labs and imaging studies. Pertinent labs include:  Recent Labs      02/04/18   1202  02/04/18   0530  02/03/18   1314  02/03/18   0518   02/02/18 0225   WBC   --   8.5   --   12.1*   --   11.8*   HGB  8.2*  7.8*  10.8*  10.3*   < >  10.0*   HCT  24.5*  23.0*   --   29.4*   < >  31.1*   PLT   --   182   --   190   --   252    < > = values in this interval not displayed.      Recent Labs      02/04/18   0530  02/03/18   0518  02/02/18   0559  02/02/18 0225   NA  146*  143   --   137   K  3.6  3.7   --   4.1   CL  116*  113*   --   104   CO2  25  22   --   23   GLU  96  97   --   135*   BUN  14  20   --   36*   CREA  1.14  1.13   --   1.37*   CA  7.9*  7.6*   --   8.1*   ALB  2.1*  2.5*   --   2.9*   TBILI  0.5  0.7   --   0.3   SGOT  22  24   --   16   ALT  13  16   --   20   INR   --    --   1.1   --        Signed: Mauricio Sandoval MD

## 2018-02-04 NOTE — PROGRESS NOTES
Problem: Self Care Deficits Care Plan (Adult)  Goal: *Acute Goals and Plan of Care (Insert Text)  Occupational Therapy Goals  Initiated 2/4/2018  1. Patient will perform bathing standing at sink for knees and superior, sitting knees and distal with modified independence within 7 day(s). 2.  Patient will perform lower body dressing with independence within 7 day(s). 3.  Patient will perform simple home management (make bed, organize room, item retrieval from floor/closet/cabinets) with independence within 7 day(s). 4.  Patient will perform toilet transfers with independence within 7 day(s). 5.  Patient will perform all aspects of toileting with independence within 7 day(s). 6.  Patient will participate in standing upper extremity therapeutic exercise/activities with independence for 10 minutes without LOB within 7 day(s). 7.  Patient will utilize energy conservation techniques during functional activities with verbal cues within 7 day(s). Occupational Therapy EVALUATION  Patient: Annette Key Sr. (77 y.o. male)  Date: 2/4/2018  Primary Diagnosis: Acute blood loss anemia  GI bleed  Procedure(s) (LRB):  ESOPHAGOGASTRODUODENOSCOPY (EGD) (N/A)  ENDOSCOPIC BRUSHING (N/A)  ESOPHAGOGASTRODUODENAL (EGD) BIOPSY (N/A)  RESOLUTION CLIP x 2  (N/A)  INJECTION (N/A)  BICAP (N/A) 2 Days Post-Op   Precautions: fall, [place]       ASSESSMENT :  Based on the objective data described below, the patient presents with HR in low 100's at rest and with activity, decreased ADL, I-ADL, vocation, standing tolerance/balance, safety, strength/endurance, cardiovascular endurance with total A -I for simple ADL tasks. Patient is tolerating weaning of O2 today at mid-upper 90s on room air without s/s.   Patient is CGA-min A for simple functional transfers with slight LOB posterior/unsteadiness with posterior pericare/hygiene at sink, D for grady management, min A for bowel hygiene, min A for clothing management with gown, fatigue with standing  At sink for grooming and pericare. Patient c/o stiffness and weakness with hospitalization and is a great candidate for IP rehab as he works full time and was I PTA for all ADl, I-ADL withOUT AD or extra time. Patient is eager and motivated--will benefit from IP rehab due to his ability to tolerate 3 hours of therapy and I PLOF, great attitude. Patient will benefit from skilled intervention to address the above impairments. Patients rehabilitation potential is considered to be Excellent  Factors which may influence rehabilitation potential include:   [x]             None noted  []             Mental ability/status  []             Medical condition  []             Home/family situation and support systems  []             Safety awareness  []             Pain tolerance/management  []             Other:      PLAN :  Recommendations and Planned Interventions:  [x]               Self Care Training                  [x]        Therapeutic Activities  [x]               Functional Mobility Training    []        Cognitive Retraining  [x]               Therapeutic Exercises           [x]        Endurance Activities  [x]               Balance Training                   [x]        Neuromuscular Re-Education  []               Visual/Perceptual Training     [x]   Home Safety Training  [x]               Patient Education                 [x]        Family Training/Education  []               Other (comment):    Frequency/Duration: Patient will be followed by occupational therapy 5 times a week to address goals. Discharge Recommendations: Inpatient Rehab  Further Equipment Recommendations for Discharge: defer to IP rehab     SUBJECTIVE:   Patient stated I am stiff. .. I went from life and death to life, so I feel good.     OBJECTIVE DATA SUMMARY:   HISTORY:   History reviewed. No pertinent past medical history.   Past Surgical History:   Procedure Laterality Date    HX COLONOSCOPY N/A 08/2013    HX PROSTATECTOMY Bx January 2018       Prior Level of Function/Environment/Context: he works full time and was I PTA for all ADl, I-ADL withOUT AD or extra time, +vocation (rights activist), daughter lives with him (she works)  Occupations in which the patient is/was successful, what are the barriers preventing that success: super motivated/eager  Performance Patterns (routines, roles, habits, and rituals):   Personal Interests and/or values: LOVES his work  Expanded or extensive additional review of patient history:     Home Situation  Home Environment: Private residence  # Steps to Enter: 7  Rails to Enter: Yes  Hand Rails : Bilateral  One/Two Story Residence: Two story  # of Interior Steps: 13  Interior Rails: Right  Living Alone: No (dtr lives with him but works)  Support Systems: Family member(s), Child(juarez)  Patient Expects to be Discharged to[de-identified] Private residence  Current DME Used/Available at Home: None  [x]  Right hand dominant   []  Left hand dominant    EXAMINATION OF PERFORMANCE DEFICITS:  Cognitive/Behavioral Status:                          Hearing: Auditory  Auditory Impairment: None    Vision/Perceptual:                                     Range of Motion:  BUE  AROM: Generally decreased, functional (2/3 AROM B shoudlers, h/o dislocations in football per pt)                         Strength:  BUE  Strength: Generally decreased, functional (B GH 4/5, elbow and distal WFL)                Coordination:  Coordination: Within functional limits  Fine Motor Skills-Upper: Left Intact; Right Intact    Gross Motor Skills-Upper: Left Intact; Right Intact    Tone & Sensation:  NT                            Balance:  Sitting: Intact  Standing: Impaired; Without support  Standing - Static: Good;Fair  Standing - Dynamic : Fair    Functional Mobility and Transfers for ADLs:  Bed Mobility:       Transfers:  Sit to Stand: Contact guard assistance  Stand to Sit: Contact guard assistance  Toilet Transfer : Contact guard assistance    ADL Assessment:  Feeding: Independent    Oral Facial Hygiene/Grooming: Setup    Bathing: Contact guard assistance    Upper Body Dressing: Minimum assistance    Lower Body Dressing: Contact guard assistance    Toileting: Total assistance;Minimum assistance (MANDEEP grady, min A for bowel hygine in standing, multiple wipes)                ADL Intervention and task modifications:       Grooming  Grooming Assistance: Stand-by assistance;Contact guard assistance (STANDING at sink, FATIGUED with prolonged standing)  Washing Face: Stand-by assistance;Contact guard assistance  Washing Hands: Stand-by assistance;Contact guard assistance  Brushing Teeth: Stand-by assistance;Contact guard assistance                   Lower Body Dressing Assistance  Socks: Supervision/set-up  Leg Crossed Method Used: Yes  Position Performed: Seated in chair    Toileting  Toileting Assistance: Minimum assistance; Total assistance(dependent)  Bladder Hygiene: Total assistance (dependent) (grady D)  Bowel Hygiene: Minimum assistance (LOB posterior, slight, fatigue wtih reps of hygeine)  Clothing Management: Minimum assistance (for gown management)  Cues: Verbal cues provided         Therapeutic Exercise:    Functional Measure:  Barthel Index:    Bathin  Bladder: 0  Bowels: 10  Groomin  Dressin  Feeding: 10  Mobility: 0  Stairs: 5  Toilet Use: 0  Transfer (Bed to Chair and Back): 10  Total: 45       Barthel and G-code impairment scale:  Percentage of impairment CH  0% CI  1-19% CJ  20-39% CK  40-59% CL  60-79% CM  80-99% CN  100%   Barthel Score 0-100 100 99-80 79-60 59-40 20-39 1-19   0   Barthel Score 0-20 20 17-19 13-16 9-12 5-8 1-4 0      The Barthel ADL Index: Guidelines  1. The index should be used as a record of what a patient does, not as a record of what a patient could do. 2. The main aim is to establish degree of independence from any help, physical or verbal, however minor and for whatever reason.   3. The need for supervision renders the patient not independent. 4. A patient's performance should be established using the best available evidence. Asking the patient, friends/relatives and nurses are the usual sources, but direct observation and common sense are also important. However direct testing is not needed. 5. Usually the patient's performance over the preceding 24-48 hours is important, but occasionally longer periods will be relevant. 6. Middle categories imply that the patient supplies over 50 per cent of the effort. 7. Use of aids to be independent is allowed. Alfredo Mccullough., Barthel, D.W. (1475). Functional evaluation: the Barthel Index. [address] [facility] (14)2. Jimmy Polo angela LANDON FayeF, Amanda Tate., Lukasz Velasco., [facility], [address] (1999). Measuring the change indisability after inpatient rehabilitation; comparison of the responsiveness of the Barthel Index and Functional Kerr Measure. Journal of Neurology, Neurosurgery, and Psychiatry, 66(4), 328-654. Chapito Jim, N.J.A, ANNA Jeffery, & Laurie Ren M.A. (2004.) Assessment of post-stroke quality of life in cost-effectiveness studies: The usefulness of the Barthel Index and the EuroQoL-5D. Quality of Life Research, 13, 950-48       G codes: In compliance with CMSs Claims Based Outcome Reporting, the following G-code set was chosen for this patient based on their primary functional limitation being treated: The outcome measure chosen to determine the severity of the functional limitation was the Barthel Index  with a score of 45/100 which was correlated with the impairment scale. ?  Self Care:     - CURRENT STATUS: CK - 40%-59% impaired, limited or restricted    - GOAL STATUS: CJ - 20%-39% impaired, limited or restricted    - D/C STATUS:  ---------------To be determined---------------     Occupational Therapy Evaluation Charge Determination   History Examination Decision-Making   LOW Complexity : Brief history review  MEDIUM Complexity : 3-5 performance deficits relating to physical, cognitive , or psychosocial skils that result in activity limitations and / or participation restrictions MEDIUM Complexity : Patient may present with comorbidities that affect occupational performnce. Miniml to moderate modification of tasks or assistance (eg, physical or verbal ) with assesment(s) is necessary to enable patient to complete evaluation       Based on the above components, the patient evaluation is determined to be of the following complexity level: LOW   Pain:  Pain Scale 1: Numeric (0 - 10)  Pain Intensity 1: 0              Activity Tolerance:   Good with tachycardia yet no s/s in low 100s. Visit Vitals    /55 (BP 1 Location: Right arm, BP Patient Position: Sitting;Pre-activity)    Pulse (!) 102    Temp 98 °F (36.7 °C)    Resp 16    Ht 6' (1.829 m)    Wt 86.2 kg (190 lb)    SpO2 98%    BMI 25.77 kg/m2       Please refer to the flowsheet for vital signs taken during this treatment. After treatment:   [x] Patient left in no apparent distress sitting up in chair  [] Patient left in no apparent distress in bed  [] Call bell left within reach  [x] Nursing notified  [] Caregiver present  [] Bed alarm activated    COMMUNICATION/EDUCATION:   The patients plan of care was discussed with: Physical Therapist and Registered Nurse. [x] Home safety education was provided and the patient/caregiver indicated understanding. [x] Patient/family have participated as able in goal setting and plan of care. [x] Patient/family agree to work toward stated goals and plan of care. [] Patient understands intent and goals of therapy, but is neutral about his/her participation. [] Patient is unable to participate in goal setting and plan of care. This patients plan of care is appropriate for delegation to [facility].     Thank you for this referral.  Joseph Bullard, OTR/L  Time Calculation: 28 mins

## 2018-02-04 NOTE — PROGRESS NOTES
GI Progress Note (for Yessenia Hoover Sr. :1950 S   Prim GI: Marky Diamond MD  PCP: Kaitlyn Jones MD  Date/Time:  2018 9:59 AM   Assessment:   · UGI bleeding  bleeding DU  · Bleeding not able to be controlled endoscopically on ; s/p IR guided embolization of GDA on  (active bleeding seen on angiogram of GDA)  · Acute post-hemorrhagic anemia  · Downtrend in Hgb noted; likely equilibration/dilution as pt has not had a BM for almost 24 hours thus not recurrent bleeding  · +KALPANA     Plan:   · Monitor for recurrent bleeding; melena is not to be unexpected for some time, this is best evidenced by hematemesis/hematochezia, hemodynamic instability  · Serial CBC's with goal Hgb >7  · Continue BID PPI  · Will need treatment of his H. Pylori  · Avoid non-essential NSAIDs  · Dr. Ollie Ba to resume care tomorrow     Subjective:   Pt w/o any BM's since yesterday morning/afternoon. Complaint Y/N Description   Abdominal Pain     Hematemesis     Hematochezia     Melena     Constipation     Diarrhea     Dyspepsia     Dysphagia     Jaundiced     Nausea/vomiting       Review of Systems:  Symptom Y/N Comments  Symptom Y/N Comments   Fever/Chills    Chest Pain     Cough    Headaches     Sputum    Joint Pain     SOB/EVERETT    Pruritis/Rash     Tolerating Diet    Other       Could NOT obtain due to:      Objective:   VITALS:   Last 24hrs VS reviewed since prior progress note. Most recent are:  Visit Vitals    /56 (BP 1 Location: Right arm, BP Patient Position: At rest)    Pulse 84    Temp 98.5 °F (36.9 °C)    Resp 16    Ht 6' (1.829 m)    Wt 86.2 kg (190 lb)    SpO2 99%    BMI 25.77 kg/m2       Intake/Output Summary (Last 24 hours) at 18 0959  Last data filed at 18 0743   Gross per 24 hour   Intake              990 ml   Output             3040 ml   Net            -2050 ml     PHYSICAL EXAM:  General: WD, WN.  Alert, cooperative, no acute distress    HEENT: NC, Atraumatic. PERRLA, EOMI. Anicteric sclerae. Lungs:  CTA Bilaterally. No Wheezing/Rhonchi/Rales. Heart:  Regular  rhythm,  No murmur (), No Rubs, No Gallops  Abdomen: Soft, Non distended, Non tender.  +Bowel sounds, no HSM  Extremities: No c/c/e  Neurologic:  Alert and oriented X 3. No acute neurological distress   Psych:   Good insight. Not anxious nor agitated. Lab and Radiology Data Reviewed: (see below)    Medications Reviewed: (see below)  PMH/SH reviewed - no change compared to H&P  ________________________________________________________________________  Care Plan discussed with:  Patient x   Family     RN x              Consultant:  checo Whitney MD     Procedures: see electronic medical records for all procedures/Xrays and details which were not copied into this note but were reviewed prior to creation of Plan. LABS:  Recent Labs      02/04/18   0530  02/03/18   1314  02/03/18   0518   WBC  8.5   --   12.1*   HGB  7.8*  10.8*  10.3*   HCT  23.0*   --   29.4*   PLT  182   --   190     Recent Labs      02/04/18   0530  02/03/18   0518  02/02/18   0225   NA  146*  143  137   K  3.6  3.7  4.1   CL  116*  113*  104   CO2  25  22  23   BUN  14  20  36*   CREA  1.14  1.13  1.37*   GLU  96  97  135*   CA  7.9*  7.6*  8.1*     Recent Labs      02/04/18   0530  02/03/18   0518  02/02/18   0225   SGOT  22  24  16   AP  53  64  73   TP  4.6*  5.6*  6.2*   ALB  2.1*  2.5*  2.9*   GLOB  2.5  3.1  3.3   LPSE   --   328  775*     Recent Labs      02/02/18   0559   INR  1.1   PTP  10.6      No results for input(s): FE, TIBC, PSAT, FERR in the last 72 hours. No results found for: FOL, RBCF  Recent Labs      02/02/18   1911   PH  7.41   PCO2  31*   PO2  98     No results for input(s): CPK, CKMB in the last 72 hours.     No lab exists for component: TROPONINI  Lab Results   Component Value Date/Time    Color YELLOW/STRAW 02/02/2018 07:40 PM    Appearance CLEAR 02/02/2018 07:40 PM    Specific gravity 1.030 02/02/2018 07:40 PM    Specific gravity 1.021 02/02/2018 04:35 AM    pH (UA) 5.0 02/02/2018 07:40 PM    Protein NEGATIVE  02/02/2018 07:40 PM    Glucose NEGATIVE  02/02/2018 07:40 PM    Ketone NEGATIVE  02/02/2018 07:40 PM    Bilirubin NEGATIVE  02/02/2018 07:40 PM    Urobilinogen 0.2 02/02/2018 07:40 PM    Nitrites NEGATIVE  02/02/2018 07:40 PM    Leukocyte Esterase NEGATIVE  02/02/2018 07:40 PM    Epithelial cells FEW 02/02/2018 07:40 PM    Bacteria NEGATIVE  02/02/2018 07:40 PM    WBC 0-4 02/02/2018 07:40 PM    RBC 20-50 02/02/2018 07:40 PM       MEDICATIONS:  Current Facility-Administered Medications   Medication Dose Route Frequency    LORazepam (ATIVAN) injection 2 mg  2 mg IntraVENous Q1H PRN    LORazepam (ATIVAN) injection 4 mg  4 mg IntraVENous Q1H PRN    acetaminophen (TYLENOL) tablet 500 mg  500 mg Oral Q4H PRN    pantoprazole (PROTONIX) 40 mg in sodium chloride 0.9 % 10 mL injection  40 mg IntraVENous Q12H    0.9% sodium chloride infusion  75 mL/hr IntraVENous CONTINUOUS    sodium chloride (NS) flush 5-10 mL  5-10 mL IntraVENous Q8H    sodium chloride (NS) flush 5-10 mL  5-10 mL IntraVENous PRN    ondansetron (ZOFRAN) injection 4 mg  4 mg IntraVENous Q4H PRN    0.9% sodium chloride infusion 250 mL  250 mL IntraVENous PRN    0.9% sodium chloride infusion 250 mL  250 mL IntraVENous PRN    meropenem (MERREM) 1 g in 0.9% sodium chloride (MBP/ADV) 50 mL  1 g IntraVENous Q8H    mupirocin (BACTROBAN) 2 % ointment   Both Nostrils Q12H

## 2018-02-04 NOTE — PROGRESS NOTES
Problem: Falls - Risk of  Goal: *Absence of Falls  Document [place] Fall Risk and appropriate interventions in the flowsheet.    Outcome: Progressing Towards Goal  Fall Risk Interventions:  Mobility Interventions: Bed/chair exit alarm, Patient to call before getting OOB         Medication Interventions: Patient to call before getting OOB    Elimination Interventions: Bed/chair exit alarm, Call light in reach, Patient to call for help with toileting needs

## 2018-02-04 NOTE — PROGRESS NOTES
Problem: Mobility Impaired (Adult and Pediatric)  Goal: *Acute Goals and Plan of Care (Insert Text)  Physical Therapy Goals  Initiated 2/4/2018  1. Patient will move from supine to sit and sit to supine , scoot up and down and roll side to side in bed with independence within 7 day(s). 2.  Patient will transfer from bed to chair and chair to bed with modified independence using the least restrictive device within 7 day(s). 3.  Patient will perform sit to stand with independence within 7 day(s). 4.  Patient will ambulate with modified independence for 100 feet with the least restrictive device within 7 day(s). 5.  Patient will ascend/descend 15 stairs with single handrail(s) with supervision/set-up within 7 day(s). physical Therapy EVALUATION  Patient: Megan Bond Sr. (77 y.o. male)  Date: 2/4/2018  Primary Diagnosis: Acute blood loss anemia  GI bleed  Procedure(s) (LRB):  ESOPHAGOGASTRODUODENOSCOPY (EGD) (N/A)  ENDOSCOPIC BRUSHING (N/A)  ESOPHAGOGASTRODUODENAL (EGD) BIOPSY (N/A)  RESOLUTION CLIP x 2  (N/A)  INJECTION (N/A)  BICAP (N/A) 2 Days Post-Op   Precautions: Fall    ASSESSMENT :  Based on the objective data described below, the patient presents with generalized weakness, impaired balance, and decreased activity tolerance all limiting patients functional mobility. Noted patient to be mildly tachycardic with activity but BP stable and SpO2 stable on RA. Patient was unsteady with ambulation and presents with an increased fall risk. Mild c/o feeling lightheaded with activity further limiting activity tolerance. This is a significant decline for patient as he is very independent at baseline. Patient is very motivated and would benefit from intensive rehab to return to baseline. Recommend inpatient rehab. Patient will benefit from skilled intervention to address the above impairments.   Patients rehabilitation potential is considered to be Good  Factors which may influence rehabilitation potential include:   [x]         None noted  []         Mental ability/status  []         Medical condition  []         Home/family situation and support systems  []         Safety awareness  []         Pain tolerance/management  []         Other:      PLAN :  Recommendations and Planned Interventions:  [x]           Bed Mobility Training             []    Neuromuscular Re-Education  [x]           Transfer Training                   []    Orthotic/Prosthetic Training  [x]           Gait Training                         []    Modalities  [x]           Therapeutic Exercises           []    Edema Management/Control  [x]           Therapeutic Activities            [x]    Patient and Family Training/Education  []           Other (comment):    Frequency/Duration: Patient will be followed by physical therapy  5 times a week to address goals. Discharge Recommendations: Inpatient Rehab  Further Equipment Recommendations for Discharge: Defer to rehab      SUBJECTIVE:   Patient stated I want to get stronger.     OBJECTIVE DATA SUMMARY:   HISTORY:    History reviewed. No pertinent past medical history. Past Surgical History:   Procedure Laterality Date    HX COLONOSCOPY N/A 08/2013    HX PROSTATECTOMY      Bx January 2018     Prior Level of Function/Home Situation: Prior independence, no DME, drives and still works   Personal factors and/or comorbidities impacting plan of care:     Home Situation  Home Environment: Private residence  # Steps to Enter: 7  Rails to Enter: Yes  Hand Rails : Bilateral  One/Two Story Residence: Two story  # of Interior Steps: 13  Interior Rails: Right  Living Alone: No (dtr lives with him but works)  Support Systems: Family member(s), Child(juarez)  Patient Expects to be Discharged to[de-identified] Private residence  Current DME Used/Available at Home: None    EXAMINATION/PRESENTATION/DECISION MAKING:   Critical Behavior:   Patient alert and oriented      Hearing:   Auditory  Auditory Impairment: None  Range Of Motion:  AROM: Generally decreased, functional     Strength:    Strength: Generally decreased, functional     Tone & Sensation:    Intact     Coordination:  Coordination: Within functional limits    Functional Mobility:  Bed Mobility:   Not assessed; patient in chair on arrival      Transfers:  Sit to Stand: Contact guard assistance  Stand to Sit: Contact guard assistance        Balance:   Sitting: Intact  Standing: Impaired  Standing - Static: Fair  Standing - Dynamic : Fair  Ambulation/Gait Training:  Distance (ft): 30 Feet (ft)  Assistive Device: Gait belt (no AD 20 ft, RW for 10 ft)  Ambulation - Level of Assistance: Minimal assistance  Gait Abnormalities: Decreased step clearance; Path deviations;Trunk sway increased  Speed/Jaimie: Slow  Step Length: Left shortened;Right shortened      Patient slow, unsteady with LOB   Stairs:    Unable to safely attempt     Functional Measure:  Barthel Index:    Bathin  Bladder: 0  Bowels: 10  Groomin  Dressin  Feeding: 10  Mobility: 0  Stairs: 5  Toilet Use: 0  Transfer (Bed to Chair and Back): 10  Total: 45     Barthel and G-code impairment scale:  Percentage of impairment CH  0% CI  1-19% CJ  20-39% CK  40-59% CL  60-79% CM  80-99% CN  100%   Barthel Score 0-100 100 99-80 79-60 59-40 20-39 1-19   0   Barthel Score 0-20 20 17-19 13-16 9-12 5-8 1-4 0      The Barthel ADL Index: Guidelines  1. The index should be used as a record of what a patient does, not as a record of what a patient could do. 2. The main aim is to establish degree of independence from any help, physical or verbal, however minor and for whatever reason. 3. The need for supervision renders the patient not independent. 4. A patient's performance should be established using the best available evidence. Asking the patient, friends/relatives and nurses are the usual sources, but direct observation and common sense are also important. However direct testing is not needed.   5. Usually the patient's performance over the preceding 24-48 hours is important, but occasionally longer periods will be relevant. 6. Middle categories imply that the patient supplies over 50 per cent of the effort. 7. Use of aids to be independent is allowed. Annie Henning., Barthel, D.W. (6482). Functional evaluation: the Barthel Index. [address] [facility] (14)2. RODNEY Key, Nelda Petersen., Jayro Parikh., Tammy, [address] (1999). Measuring the change indisability after inpatient rehabilitation; comparison of the responsiveness of the Barthel Index and Functional [place] Measure. Journal of Neurology, Neurosurgery, and Psychiatry, 66(4), 824-091. GRISELDA Weston, ANNA Jeffery, & Octaviano Lynch M.A. (2004.) Assessment of post-stroke quality of life in cost-effectiveness studies: The usefulness of the Barthel Index and the EuroQoL-5D. Quality of Life Research, 13, 543-44     G codes: In compliance with CMSs Claims Based Outcome Reporting, the following G-code set was chosen for this patient based on their primary functional limitation being treated: The outcome measure chosen to determine the severity of the functional limitation was the Barthel index with a score of 45/100 which was correlated with the impairment scale.     ? Mobility - Walking and Moving Around:     - CURRENT STATUS: CK - 40%-59% impaired, limited or restricted    - GOAL STATUS: CJ - 20%-39% impaired, limited or restricted    - D/C STATUS:  ---------------To be determined---------------      Physical Therapy Evaluation Charge Determination   History Examination Presentation Decision-Making   LOW Complexity : Zero comorbidities / personal factors that will impact the outcome / POC LOW Complexity : 1-2 Standardized tests and measures addressing body structure, function, activity limitation and / or participation in recreation  LOW Complexity : Stable, uncomplicated  Other outcome measures barthel index  LOW       Based on the above components, the patient evaluation is determined to be of the following complexity level: LOW     Pain:  Pain Scale 1: Numeric (0 - 10)  Pain Intensity 1: 0     Activity Tolerance:   Mild tachycardia, BP and SpO2 stable   Please refer to the flowsheet for vital signs taken during this treatment. After treatment:   [x]         Patient left in no apparent distress sitting up in chair  []         Patient left in no apparent distress in bed  [x]         Call bell left within reach  [x]         Nursing notified  []         Caregiver present  []         Bed alarm activated    COMMUNICATION/EDUCATION:   The patients plan of care was discussed with: Registered Nurse. [x]         Fall prevention education was provided and the patient/caregiver indicated understanding. [x]         Patient/family have participated as able in goal setting and plan of care. [x]         Patient/family agree to work toward stated goals and plan of care. []         Patient understands intent and goals of therapy, but is neutral about his/her participation. []         Patient is unable to participate in goal setting and plan of care.     Thank you for this referral.  Carolyne Villegas, PT, DPT   Time Calculation: 21 mins

## 2018-02-05 LAB
ALBUMIN SERPL-MCNC: 2 G/DL (ref 3.5–5)
ALBUMIN/GLOB SERPL: 0.7 {RATIO} (ref 1.1–2.2)
ALP SERPL-CCNC: 53 U/L (ref 45–117)
ALT SERPL-CCNC: 11 U/L (ref 12–78)
ANION GAP SERPL CALC-SCNC: 7 MMOL/L (ref 5–15)
AST SERPL-CCNC: 18 U/L (ref 15–37)
BASOPHILS # BLD: 0 K/UL (ref 0–0.1)
BASOPHILS NFR BLD: 0 % (ref 0–1)
BILIRUB SERPL-MCNC: 0.5 MG/DL (ref 0.2–1)
BUN SERPL-MCNC: 7 MG/DL (ref 6–20)
BUN/CREAT SERPL: 7 (ref 12–20)
CALCIUM SERPL-MCNC: 7.7 MG/DL (ref 8.5–10.1)
CHLORIDE SERPL-SCNC: 113 MMOL/L (ref 97–108)
CO2 SERPL-SCNC: 25 MMOL/L (ref 21–32)
CREAT SERPL-MCNC: 1 MG/DL (ref 0.7–1.3)
DIFFERENTIAL METHOD BLD: ABNORMAL
EOSINOPHIL # BLD: 0.2 K/UL (ref 0–0.4)
EOSINOPHIL NFR BLD: 2 % (ref 0–7)
ERYTHROCYTE [DISTWIDTH] IN BLOOD BY AUTOMATED COUNT: 15.5 % (ref 11.5–14.5)
GLOBULIN SER CALC-MCNC: 2.7 G/DL (ref 2–4)
GLUCOSE SERPL-MCNC: 96 MG/DL (ref 65–100)
HCT VFR BLD AUTO: 21.6 % (ref 36.6–50.3)
HCT VFR BLD AUTO: 22.6 % (ref 36.6–50.3)
HGB BLD-MCNC: 6.9 G/DL (ref 12.1–17)
HGB BLD-MCNC: 7.6 G/DL (ref 12.1–17)
IMM GRANULOCYTES # BLD: 0.1 K/UL (ref 0–0.04)
IMM GRANULOCYTES NFR BLD AUTO: 1 % (ref 0–0.5)
LYMPHOCYTES # BLD: 1.3 K/UL (ref 0.8–3.5)
LYMPHOCYTES NFR BLD: 17 % (ref 12–49)
MCH RBC QN AUTO: 31.7 PG (ref 26–34)
MCHC RBC AUTO-ENTMCNC: 33.6 G/DL (ref 30–36.5)
MCV RBC AUTO: 94.2 FL (ref 80–99)
MONOCYTES # BLD: 0.9 K/UL (ref 0–1)
MONOCYTES NFR BLD: 12 % (ref 5–13)
NEUTS SEG # BLD: 5.2 K/UL (ref 1.8–8)
NEUTS SEG NFR BLD: 68 % (ref 32–75)
NRBC # BLD: 0 K/UL (ref 0–0.01)
NRBC BLD-RTO: 0 PER 100 WBC
PLATELET # BLD AUTO: 215 K/UL (ref 150–400)
PMV BLD AUTO: 10.1 FL (ref 8.9–12.9)
POTASSIUM SERPL-SCNC: 3.4 MMOL/L (ref 3.5–5.1)
PROT SERPL-MCNC: 4.7 G/DL (ref 6.4–8.2)
RBC # BLD AUTO: 2.4 M/UL (ref 4.1–5.7)
SODIUM SERPL-SCNC: 145 MMOL/L (ref 136–145)
WBC # BLD AUTO: 7.7 K/UL (ref 4.1–11.1)

## 2018-02-05 PROCEDURE — 97116 GAIT TRAINING THERAPY: CPT

## 2018-02-05 PROCEDURE — 85025 COMPLETE CBC W/AUTO DIFF WBC: CPT | Performed by: INTERNAL MEDICINE

## 2018-02-05 PROCEDURE — 65270000029 HC RM PRIVATE

## 2018-02-05 PROCEDURE — 97530 THERAPEUTIC ACTIVITIES: CPT

## 2018-02-05 PROCEDURE — 74011250636 HC RX REV CODE- 250/636: Performed by: INTERNAL MEDICINE

## 2018-02-05 PROCEDURE — 36415 COLL VENOUS BLD VENIPUNCTURE: CPT | Performed by: INTERNAL MEDICINE

## 2018-02-05 PROCEDURE — 74011000250 HC RX REV CODE- 250: Performed by: INTERNAL MEDICINE

## 2018-02-05 PROCEDURE — 80053 COMPREHEN METABOLIC PANEL: CPT | Performed by: INTERNAL MEDICINE

## 2018-02-05 PROCEDURE — 74011250637 HC RX REV CODE- 250/637: Performed by: INTERNAL MEDICINE

## 2018-02-05 PROCEDURE — C9113 INJ PANTOPRAZOLE SODIUM, VIA: HCPCS | Performed by: INTERNAL MEDICINE

## 2018-02-05 RX ORDER — DOXYCYCLINE HYCLATE 100 MG
100 TABLET ORAL EVERY 12 HOURS
Status: DISCONTINUED | OUTPATIENT
Start: 2018-02-05 | End: 2018-02-07 | Stop reason: HOSPADM

## 2018-02-05 RX ORDER — PANTOPRAZOLE SODIUM 40 MG/1
40 TABLET, DELAYED RELEASE ORAL
Status: DISCONTINUED | OUTPATIENT
Start: 2018-02-05 | End: 2018-02-07 | Stop reason: HOSPADM

## 2018-02-05 RX ORDER — BISMUTH SUBSALICYLATE 262 MG/15ML
30 LIQUID ORAL 4 TIMES DAILY
Status: DISCONTINUED | OUTPATIENT
Start: 2018-02-05 | End: 2018-02-07 | Stop reason: HOSPADM

## 2018-02-05 RX ADMIN — MUPIROCIN: 20 OINTMENT TOPICAL at 21:07

## 2018-02-05 RX ADMIN — PANTOPRAZOLE SODIUM 40 MG: 40 TABLET, DELAYED RELEASE ORAL at 15:55

## 2018-02-05 RX ADMIN — HYDROCORTISONE 30 ML: 1 OINTMENT TOPICAL at 17:50

## 2018-02-05 RX ADMIN — Medication 10 ML: at 13:05

## 2018-02-05 RX ADMIN — METRONIDAZOLE 500 MG: 250 TABLET ORAL at 21:49

## 2018-02-05 RX ADMIN — MUPIROCIN: 20 OINTMENT TOPICAL at 10:15

## 2018-02-05 RX ADMIN — TAMSULOSIN HYDROCHLORIDE 0.4 MG: 0.4 CAPSULE ORAL at 08:21

## 2018-02-05 RX ADMIN — METRONIDAZOLE 500 MG: 250 TABLET ORAL at 13:06

## 2018-02-05 RX ADMIN — Medication 10 ML: at 21:08

## 2018-02-05 RX ADMIN — DOXYCYCLINE HYCLATE 100 MG: 100 TABLET, COATED ORAL at 21:07

## 2018-02-05 RX ADMIN — HYDROCORTISONE 30 ML: 1 OINTMENT TOPICAL at 13:06

## 2018-02-05 RX ADMIN — PANTOPRAZOLE SODIUM 40 MG: 40 INJECTION, POWDER, FOR SOLUTION INTRAVENOUS at 09:00

## 2018-02-05 RX ADMIN — METRONIDAZOLE 500 MG: 250 TABLET ORAL at 08:21

## 2018-02-05 RX ADMIN — HYDROCORTISONE 30 ML: 1 OINTMENT TOPICAL at 21:50

## 2018-02-05 RX ADMIN — DOXYCYCLINE HYCLATE 100 MG: 100 TABLET, COATED ORAL at 11:58

## 2018-02-05 NOTE — PROGRESS NOTES
Problem: Falls - Risk of  Goal: *Absence of Falls  Document [place] Fall Risk and appropriate interventions in the flowsheet.    Outcome: Progressing Towards Goal  Fall Risk Interventions:  Mobility Interventions: Patient to call before getting OOB    Mentation Interventions: Bed/chair exit alarm    Medication Interventions: Patient to call before getting OOB    Elimination Interventions: Bed/chair exit alarm, Call light in reach, Patient to call for help with toileting needs

## 2018-02-05 NOTE — PROGRESS NOTES
Last Urology Clinic Note    Mr. David Jerry is a very nice 59-year-old gentleman referred by Dr. David Jerry to further discuss cryoablation as a treatment for his low volume [place] 6 adenocarcinoma of the prostate. He was diagnosed in March of this year and his prostate volume was measured at 42 cc. His PSA at the time of diagnosis is 6.6. He denies any voiding difficulties. No urinary incontinence. He does have mild to moderate erectile dysfunction, which is sometimes helped with Viagra. We had a long discussion regarding cryoablation of the prostate as well as the expected convalescence. We spoke about the risks of the procedure which include, but are not limited to infection, bleeding, urinary incontinence, and even a very low chance of developing a recto-urethral fistula. He has voiced understanding. He is also aware that it will make him impotent. I offered him to learn how to perform intracorporal injection therapy prior to the procedure and he is going to think about that and discuss it with his wife. He has a brother-in-law who is a cardiologist who has undergone cryoablation in [place] and he has already made up his mind that that is the treatment option he would prefer. He was diagnosed with prostate cancer on 03/15/2012. His father also had prostate cancer. Pretreatment PSA was 6.6ng/ml. He had Biopsy Hilda 3 + 3 = 6 grade adenocarcinoma. The measured prostate volume was 42.0 ccs. PAST MEDICAL HISTORY:    Allergies: PENICILLIN (Critical)  DENIES: Shellfish, X-Ray Dye, Iodine. Medications: VIAGRA 100 MG TABS (SILDENAFIL CITRATE) take 1 tablet po one hour before sex. Max daily dose of 100mg.     Problems: 185 CARCINOMA, PROSTATE (ICD-185)  599.70 HEMATURIA UNSPECIFIED (ICD-599.70)  ELEVATED PSA (ICD-790.93)  GROSS HEMATURIA (ICD-599.71)    Illnesses: DENIES: Heart Disease, Pacemaker/Defibrillator, Lung Disease, Diabetes, High Blood Pressure, Bowel Problems, Stroke/Seizure, Kidney Problems, Bleeding Problems, HIV, Hepatitis, or Cancer. Surgeries: DENIES prior surgeries    Family History: DENIES: Prostate cancer, Kidney cancer, Kidney disease, Kidney stones. Social History: Full Time - [address] for [employer]. . Current smoker. Drinks alcohol occasionally. PSA HISTORY  6.6 ng/ml on 02/14/2012    IMPRESSION:    1. PROSTATE ADENOCARCINOMA (ICD-185) Low volume [place] 6 adenocarcinoma of the prostate. PLAN: Full discussion as outlined above. We will proceed with cryoablation of the prostate at his earliest convenience.

## 2018-02-05 NOTE — PROGRESS NOTES
General Surgery End of Shift Nursing Note    Bedside shift change report given to Dusty monk Casey (oncoming nurse) by Maren Paulino. Kaley Lewis RN (offgoing nurse). Report included the following information SBAR, Kardex, Intake/Output, MAR and Recent Results. Shift worked:   7a to 3p   Summary of shift:     seen by consult   Issues for physician to address:   none     Number times ambulated in hallway past shift: in room    Number of times OOB to chair past shift: 1    Pain Management:  Current medication: none  Patient states pain is manageable on current pain medication: YES    GI:    Current diet:  DIET GI LITE (POST SURGICAL)    Tolerating current diet: YES  Passing flatus: YES  Last Bowel Movement: today   Appearance: soft    Respiratory:    Incentive Spirometer at bedside: YES  Patient instructed on use: YES    Patient Safety:    Falls Score:   Bed Alarm On? YES  Sitter?  No    Agus Charles RN

## 2018-02-05 NOTE — CONSULTS
Urology Consult    Subjective:     Date of Consultation:  February 5, 2018    Referring Physician: Carmita Wills    Reason for Consultation:  Urinary retention    Patient Name: Reynaldo To. MRN: [id number]    History of Present Illness:   Deanna Rees is a 76 y.o.  male who presented with nausea and vomiting. Upon questioning color of vomit pt indicated coffee ground with bright red blood. He claims to have 4-5 episodes prior coming to ED. Pt reported that he is been feeling sick for past 13 days since a cystoscopic examination and bladder biopsy at the [place]. Pt reported intially noticing hematuria, gross blood in stool but later blood in stool got resolved. He also reports, started to have diarrhea which got resolved 3 days ago. Last night started to have vomiting bright red material. He does report eating beet last night. Pt denies any fever, chills, chest pain, cough, shortness of breath and abdominal pain. He is awaiting results from the [place] regarding the biopsies. Diagnosed with prostate cancer by Dr. Marya Shone and was to be scheduled for cryotherapy but he was lost to follow up. He states that acupuncture has cured him. Now followed at the [place] for his prostate cancer. He noticed diffficulty voiding since the cystoscopy. Poor force of stream, feeling of incomplete emptying. No dysuria or incontinence. Winslow placed in the hospital. Urine clear and draining well. On flomax. History reviewed. No pertinent past medical history. Past Surgical History:   Procedure Laterality Date    HX COLONOSCOPY N/A 08/2013    HX PROSTATECTOMY      Bx January 2018      History reviewed. No pertinent family history.    Social History   Substance Use Topics    Smoking status: Former Smoker     Packs/day: 1.00     Quit date: 1/2/2012    Smokeless tobacco: Current User    Alcohol use Not on file     Allergies   Allergen Reactions    Pcn [Penicillins] Anaphylaxis     Family history      Prior to Admission medications Medication Sig Start Date End Date Taking? Authorizing Provider   aspirin 81 mg chewable tablet Take 81 mg by mouth daily. Yes Historical Provider         Review of Systems:  A comprehensive review of systems was negative except for that written in the HPI. Objective:     Data Review (Labs):    Recent Labs      18   0500  18   1202  18   0530  18   1314  18   0518  18   1956   WBC  7.7   --   8.5   --   12.1*   --    HGB  7.6*  8.2*  7.8*  10.8*  10.3*  9.7*   MCV  94.2   --   92.0   --   91.3   --    PLT  215   --   182   --   190   --    NA  145   --   146*   --   143   --    K  3.4*   --   3.6   --   3.7   --    CREA  1.00   --   1.14   --   1.13   --    BUN  7   --   14   --   20   --    ALB  2.0*   --   2.1*   --   2.5*   --    TBILI  0.5   --   0.5   --   0.7   --    SGOT  18   --   22   --   24   --    ALT  11*   --   13   --   16   --    AP  53   --   53   --   64   --    LPSE   --    --    --    --   328   --    IPVITALS(8:)Temp (24hrs), Av.6 °F (37 °C), Min:98.5 °F (36.9 °C), Max:98.7 °F (37.1 °C)    Temp (24hrs), Av.6 °F (37 °C), Min:98.5 °F (36.9 °C), Max:98.7 °F (37.1 °C)  WQUUSQCK132/03 1901 -  0700  In: -   Out: 9040 [Urine:4590]    Physical Exam:            General:    alert, cooperative, no distress, appears stated age                     Skin:  Normal.   Lymph nodes:  Cervical, supraclavicular, and axillary nodes normal.             Abdomen[de-identified]  soft, non-tender.  Bowel sounds normal. No masses,  no organomegaly, inguinal bandage noted             Genitalia:  testicles descended, no masses, penis without lesions, grady draining well          Extremities:  negative       Assessment:     Active Problems:    Acute blood loss anemia (2018)      Hematuria (2018)      Upper GI bleeding (2018)      Lower GI bleed (2018)          Urinary retention    Prostate cancer    History of hematuria    Plan:     Continue grady cath as well as flomax  Will schedule voiding trial and prostate exam in office later this week.      Signed By: Rain Casper MD                         February 5, 2018

## 2018-02-05 NOTE — PROGRESS NOTES
Hospitalist Progress Note    NAME: Marilyn Blas Sr.   :  1950   MRN:  [id number]       Assessment / Plan:  Acute Blood Loss Anemia  Duodenal ulcer s/p EGD with Clipping, epi and APC and subsequent embolization in IR on   Upper GIB with Coffee Ground Emesis and Melena  -continue telemetry  -s/p 3 units pRBC's on , Hgb seems to have stabilized  -had IV PPI for 72 hours, now switched to PO  -check Hgb daily while in hospital  -wean O2 as tolerated (never hypoxia, was placed for anemia)  -up with assistance; PT/OT ordered  -GI lite started    H pylori: needs 2 weeks of therapy  -note PCN allergy  -continue Flagyl from below  -start Doxy (in place of tetracycline)  -start bismuth subsalicylate QID  -continue BID PPI     Severe Sepsis on   -Lactate improved  -PNA ruled out  -BCx's with ND 3 d   -patient initially on Meropenem empirically and transitioned to Levaquin and Flagyl on   -stop Levaquin and continue Flagyl for H. pylori    Pulmonary Vascular Congestion on  CXR  -stop IVF's  -hold lasix since BP is the lower limit of normal    Acute Pancreatitis: Drinks one bottle of wine nightly  -Lipase improved  -RUQ ultrasound only with hpatic steatosis, LFT's normal     Orthostatic Hypotension:  -should be resolved, will monitor    Hematuria, Suprapubic Tenderness  Mild renal insufficiency; baseline renal function unknown; improved  Urinary retenetion  -monitor renal function  -Winslow placed last night, Urology consult per patient/family request; awaiting  -start Flomax     Code Status: Full  Surrogate Decision Maker: Son     DVT Prophylaxis: SCDs     Body mass index is 25.77 kg/(m^2).        Inpatient Rehab recommended by PT/OT, referral sent     Subjective:     Chief Complaint / Reason for Physician Visit: follow-up GIB  Feels better, tolerating PO  Had dark brown formed BM  Hgb essentially stable    Review of Systems:  Symptom Y/N Comments  Symptom Y/N Comments   Fever/Chills n improved  Chest Pain n Poor Appetite    Edema n    Cough    Abdominal Pain     Sputum    Joint Pain     SOB/EVERETT n   Pruritis/Rash     Nausea/vomit n   Tolerating PT/OT     Diarrhea n   Tolerating Diet     Constipation n   Other       Could NOT obtain due to:      Objective:     VITALS:   Last 24hrs VS reviewed since prior progress note. Most recent are:  Patient Vitals for the past 24 hrs:   Temp Pulse Resp BP SpO2   02/05/18 0831 98.7 °F (37.1 °C) 93 14 111/57 97 %   02/05/18 0141 98.7 °F (37.1 °C) 87 18 99/52 99 %   02/04/18 1547 98.5 °F (36.9 °C) 97 16 102/51 91 %   02/04/18 1443 - (!) 102 - 128/55 98 %   02/04/18 1413 - (!) 103 - 116/56 -   02/04/18 1406 - (!) 105 - 116/58 97 %   02/04/18 1149 98 °F (36.7 °C) 78 16 98/48 99 %       Intake/Output Summary (Last 24 hours) at 02/05/18 1113  Last data filed at 02/05/18 0832   Gross per 24 hour   Intake              120 ml   Output             2050 ml   Net            -1930 ml        PHYSICAL EXAM:  General: WD, WN. Alert, cooperative, no acute distress    EENT:  EOMI. Anicteric sclerae. MMM  Resp:  CTA bilaterally, no wheezing or rales. No accessory muscle use but frequent coughing  CV:  RRR,  No edema  GI:  Soft, less distended, non-tender. Bowel sounds present  Neurologic:  Alert and oriented X 3, normal speech,   Psych:   Good insight. Not anxious nor agitated  Skin:  No rashes. No jaundice. Pale    Reviewed most current lab test results and cultures  YES  Reviewed most current radiology test results   YES  Review and summation of old records today    NO  Reviewed patient's current orders and MAR    YES  PMH/SH reviewed - no change compared to H&P  ________________________________________________________________________  Care Plan discussed with:    Comments   Patient x    Family      RN x    Care Manager x    Consultant                        Multidiciplinary team rounds were held today with , nursing, pharmacist and clinical coordinator.   Patient's plan of care was discussed; medications were reviewed and discharge planning was addressed. ________________________________________________________________________  Total NON critical care TIME:  25    Total CRITICAL CARE TIME Spent:  Minutes non procedure based      Comments   >50% of visit spent in counseling and coordination of care     ________________________________________________________________________  Mitch Soliman MD     Procedures: see electronic medical records for all procedures/Xrays and details which were not copied into this note but were reviewed prior to creation of Plan. LABS:  I reviewed today's most current labs and imaging studies. Pertinent labs include:  Recent Labs      02/05/18   0500  02/04/18   1202  02/04/18   0530   02/03/18   0518   WBC  7.7   --   8.5   --   12.1*   HGB  7.6*  8.2*  7.8*   < >  10.3*   HCT  22.6*  24.5*  23.0*   --   29.4*   PLT  215   --   182   --   190    < > = values in this interval not displayed.      Recent Labs      02/05/18   0500  02/04/18   0530  02/03/18   0518   NA  145  146*  143   K  3.4*  3.6  3.7   CL  113*  116*  113*   CO2  25  25  22   GLU  96  96  97   BUN  7  14  20   CREA  1.00  1.14  1.13   CA  7.7*  7.9*  7.6*   ALB  2.0*  2.1*  2.5*   TBILI  0.5  0.5  0.7   SGOT  18  22  24   ALT  11*  13  16       Signed: Mitch Soliman MD

## 2018-02-05 NOTE — PROGRESS NOTES
CM noted the consult that indicated: \"Pt son Pepe Manzano would like to talk to a CM about discharge planning. \"     CM called Son, Pepe Manzano and he indicated that he would like to speak with someone about how to get his Dad set up on Medicare. Pt is still working and plans to retire in two years per son. CM emailed [facility] to see if they could meet with his Dad to discuss Medicare Process. CM reviewed therapy recommendations and they are recommend Inpatient Rehab. Pt lives in [place]. CM will discuss with Pt [facility] referral.      9:44 AM   CM met with Pt and reviewed demographics and role of discharge planner. Pt is a 75 y/o male that was admitted on 2/2/18 d/t a Dx: SIRS with Leukocytosis, tachycardia, orthostatic hypotension, anemia, GI Bleed, Hematuria, Suprapubic tenderness. Mild renal insufficiency. Pt is a Full code. Pt is alert and oriented. Pt works for the [employer]. Pt goes to Dr. Roby Paris and the [facility]: he could not remember the name of the MD. Pt lives with his DTR in [address] home with [address] and 15 [address] to bedroom. Pt has no DME. Pt has no experience with HH or SNF. Pt indicated that he was I W ADLs and IADLs. Pt drives. Pt pharmacy is [facility] on [address]. Pt family would be able to transport him home at discharge. CM explained that therapy recommendations are for Acute Rehab. CM offered [facility] for Acute Rehab. Provided list.   Pt wants to go to [facility]. CM made referral as requested to [facility] via "[employer]. CM received email from [facility] that they do not assist W helping to get Pt on Medicare. CM explained that Medicare Website would be a good resource. CM will also make a referral to [facility] to try to see if they can help with Medicare Process Explanation via "[employer]. 3:40 PM   Amandeep Vela from [facility] indicated that [employer] is incorrect. CM asked Pt if he had a copy of the insurance card and he did not.   Pt was going to call BC/BS and he was going to call his insurance to get it straight. Shirin Hodge was notified. 4:05 PM   CM called [place] ANNE to let her know Pt BC/BS number [id number]. Basic Option #113. She stated she would update FCC. Information was given to Shirin Hodge at [facility]. CM will continue to monitor discharge plan.       Obie GallardoHonorHealth [facility]   Ext [phone]

## 2018-02-05 NOTE — PROGRESS NOTES
Attempted to see patient for OT treatment, but he is presently on the phone with his health insurance company. Will defer and likely follow back tomorrow.

## 2018-02-05 NOTE — PROGRESS NOTES
Problem: Mobility Impaired (Adult and Pediatric)  Goal: *Acute Goals and Plan of Care (Insert Text)  Physical Therapy Goals  Initiated 2/4/2018  1. Patient will move from supine to sit and sit to supine , scoot up and down and roll side to side in bed with independence within 7 day(s). 2.  Patient will transfer from bed to chair and chair to bed with modified independence using the least restrictive device within 7 day(s). 3.  Patient will perform sit to stand with independence within 7 day(s). 4.  Patient will ambulate with modified independence for 100 feet with the least restrictive device within 7 day(s). 5.  Patient will ascend/descend 15 stairs with single handrail(s) with supervision/set-up within 7 day(s). physical Therapy TREATMENT  Patient: Renzo Hinds Sr. (77 y.o. male)  Date: 2/5/2018  Diagnosis: Acute blood loss anemia  GI bleed <principal problem not specified>  Procedure(s) (LRB):  ESOPHAGOGASTRODUODENOSCOPY (EGD) (N/A)  ENDOSCOPIC BRUSHING (N/A)  ESOPHAGOGASTRODUODENAL (EGD) BIOPSY (N/A)  RESOLUTION CLIP x 2  (N/A)  INJECTION (N/A)  BICAP (N/A) 3 Days Post-Op  Precautions: Fall  Chart, physical therapy assessment, plan of care and goals were reviewed. ASSESSMENT:  Patient received sitting EOB, agreeable to PT. Patient required CGA for sit <> stand. Patient reports he is feeling better and improving appropriately. Patient ambulated 75 feet with CGA-min A without AD. Patient with improved balance although with 1-2 LOB requiring min A to correct and occasional path deviations. Patient is very active at baseline and currently functioning below his baseline. He reports he lives with his daughter although she works during the day. He reports very supportive children. He is a good candidate for IP rehab at discharge to regain independence.    Progression toward goals:  [x]    Improving appropriately and progressing toward goals  []    Improving slowly and progressing toward goals  []    Not making progress toward goals and plan of care will be adjusted     PLAN:  Patient continues to benefit from skilled intervention to address the above impairments. Continue treatment per established plan of care. Discharge Recommendations:  Inpatient Rehab  Further Equipment Recommendations for Discharge:  TBD     SUBJECTIVE:   Patient stated I think I raised my children well and I am proud of them.     OBJECTIVE DATA SUMMARY:   Critical Behavior:              Functional Mobility Training:  Bed Mobility:                    Transfers:  Sit to Stand: Contact guard assistance  Stand to Sit: Contact guard assistance        Bed to Chair: Contact guard assistance                    Balance:  Sitting: Intact; Without support  Standing: Impaired; With support  Standing - Static: Good  Standing - Dynamic : Good  Ambulation/Gait Training:  Distance (ft): 75 Feet (ft)  Assistive Device: Gait belt  Ambulation - Level of Assistance: Minimal assistance        Gait Abnormalities: Decreased step clearance              Speed/Jaimie: Slow  Step Length: Right shortened;Left shortened                  Pain:  Pain Scale 1: Numeric (0 - 10)  Pain Intensity 1: 0              Activity Tolerance:   Improving. Please refer to the flowsheet for vital signs taken during this treatment.   After treatment:   [x]    Patient left in no apparent distress sitting up in chair  []    Patient left in no apparent distress in bed  [x]    Call bell left within reach  [x]    Nursing notified  []    Caregiver present  []    Bed alarm activated    COMMUNICATION/COLLABORATION:   The patients plan of care was discussed with: Registered Nurse and     Doretha Ojeda, PT, DPT   Time Calculation: 23 mins

## 2018-02-05 NOTE — PROGRESS NOTES
Gastroenterology Daily Progress Note (Dr. Emma Hayes)   [facility]    Admit Date: 2/2/2018       Subjective:       No BM overnight. Tolerating GI lite diet. Started H pylori treatment. Current Facility-Administered Medications   Medication Dose Route Frequency    pantoprazole (PROTONIX) tablet 40 mg  40 mg Oral ACB&D    bismuth subsalicylate (PEPTO-BISMOL) oral suspension 30 mL  30 mL Oral QID    doxycycline (VIBRA-TABS) tablet 100 mg  100 mg Oral Q12H    metroNIDAZOLE (FLAGYL) tablet 500 mg  500 mg Oral Q8H    tamsulosin (FLOMAX) capsule 0.4 mg  0.4 mg Oral DAILY    acetaminophen (TYLENOL) tablet 500 mg  500 mg Oral Q4H PRN    sodium chloride (NS) flush 5-10 mL  5-10 mL IntraVENous Q8H    sodium chloride (NS) flush 5-10 mL  5-10 mL IntraVENous PRN    ondansetron (ZOFRAN) injection 4 mg  4 mg IntraVENous Q4H PRN    mupirocin (BACTROBAN) 2 % ointment   Both Nostrils Q12H        Objective:     Visit Vitals    /50    Pulse 88    Temp 98.5 °F (36.9 °C)    Resp 12    Ht 6' (1.829 m)    Wt 86.2 kg (190 lb)    SpO2 98%    BMI 25.77 kg/m2   Blood pressure 104/50, pulse 88, temperature 98.5 °F (36.9 °C), resp. rate 12, height 6' (1.829 m), weight 86.2 kg (190 lb), SpO2 98 %. 02/05 0701 - 02/05 1900  In: 240 [P.O.:240]  Out: 775 [Urine:775]    02/03 1901 - 02/05 0700  In: -   Out: 4561 [Urine:4590]      Intake/Output Summary (Last 24 hours) at 02/05/18 1310  Last data filed at 02/05/18 1306   Gross per 24 hour   Intake              240 ml   Output             1825 ml   Net            -1585 ml     Physical Exam:     General: awake and alert BM in NAD  Chest:  CTA, No rhonchi, rales or rubs. Heart: S1, S2, RRR  GI: Soft, NT, ND + bowel sounds      Labs:        Ref.  Range 2/3/2018 05:18 2/3/2018 13:14 2/4/2018 05:30 2/4/2018 12:02 2/5/2018 05:00   HGB Latest Ref Range: 12.1 - 17.0 g/dL 10.3 (L) 10.8 (L) 7.8 (L) 8.2 (L) 7.6 (L)   HCT Latest Ref Range: 36.6 - 50.3 % 29.4 (L)  23.0 (L) 24.5 (L) 22.6 (L)     Recent Results (from the past 24 hour(s))   CBC WITH AUTOMATED DIFF    Collection Time: 02/05/18  5:00 AM   Result Value Ref Range    WBC 7.7 4.1 - 11.1 K/uL    RBC 2.40 (L) 4.10 - 5.70 M/uL    HGB 7.6 (L) 12.1 - 17.0 g/dL    HCT 22.6 (L) 36.6 - 50.3 %    MCV 94.2 80.0 - 99.0 FL    MCH 31.7 26.0 - 34.0 PG    MCHC 33.6 30.0 - 36.5 g/dL    RDW 15.5 (H) 11.5 - 14.5 %    PLATELET 702 220 - 127 K/uL    MPV 10.1 8.9 - 12.9 FL    NRBC 0.0 0  WBC    ABSOLUTE NRBC 0.00 0.00 - 0.01 K/uL    NEUTROPHILS 68 32 - 75 %    LYMPHOCYTES 17 12 - 49 %    MONOCYTES 12 5 - 13 %    EOSINOPHILS 2 0 - 7 %    BASOPHILS 0 0 - 1 %    IMMATURE GRANULOCYTES 1 (H) 0.0 - 0.5 %    ABS. NEUTROPHILS 5.2 1.8 - 8.0 K/UL    ABS. LYMPHOCYTES 1.3 0.8 - 3.5 K/UL    ABS. MONOCYTES 0.9 0.0 - 1.0 K/UL    ABS. EOSINOPHILS 0.2 0.0 - 0.4 K/UL    ABS. BASOPHILS 0.0 0.0 - 0.1 K/UL    ABS. IMM. GRANS. 0.1 (H) 0.00 - 0.04 K/UL    DF AUTOMATED     METABOLIC PANEL, COMPREHENSIVE    Collection Time: 02/05/18  5:00 AM   Result Value Ref Range    Sodium 145 136 - 145 mmol/L    Potassium 3.4 (L) 3.5 - 5.1 mmol/L    Chloride 113 (H) 97 - 108 mmol/L    CO2 25 21 - 32 mmol/L    Anion gap 7 5 - 15 mmol/L    Glucose 96 65 - 100 mg/dL    BUN 7 6 - 20 MG/DL    Creatinine 1.00 0.70 - 1.30 MG/DL    BUN/Creatinine ratio 7 (L) 12 - 20      GFR est AA >60 >60 ml/min/1.73m2    GFR est non-AA >60 >60 ml/min/1.73m2    Calcium 7.7 (L) 8.5 - 10.1 MG/DL    Bilirubin, total 0.5 0.2 - 1.0 MG/DL    ALT (SGPT) 11 (L) 12 - 78 U/L    AST (SGOT) 18 15 - 37 U/L    Alk.  phosphatase 53 45 - 117 U/L    Protein, total 4.7 (L) 6.4 - 8.2 g/dL    Albumin 2.0 (L) 3.5 - 5.0 g/dL    Globulin 2.7 2.0 - 4.0 g/dL    A-G Ratio 0.7 (L) 1.1 - 2.2       Recent Labs      02/05/18   0500  02/04/18   0530  02/03/18   0518   NA  145  146*  143   K  3.4*  3.6  3.7   CL  113*  116*  113*   CO2  25  25  22   BUN  7  14  20   CREA  1.00  1.14  1.13   GLU  96  96  97   CA  7.7* 7. 9*  7.6*     Recent Labs      02/05/18   0500  02/04/18   0530  02/03/18   0518   SGOT  18  22  24   AP  53  53  64   TP  4.7*  4.6*  5.6*   ALB  2.0*  2.1*  2.5*   GLOB  2.7  2.5  3.1   LPSE   --    --   328       Impression:  Acute GI blood loss anemia  Duodenal ulcer with hemorrhage s/p EGD then angioembolization  H pylori +         Plan:  No further bleeding since successful angio with embolization.   Hgb stable at 7.6 without signs of overt ongoing bleeding.  -continue H pylori tx for 14 days  -continue BID PPI at f/u  -f/u with me post discharge to discuss eventual colonoscopy and testing to confirm H pylori eradication       Fransisca Geronimo MD    2/5/2018  [address] [place] [address]  [phone]  [address] [place] [address] [place]: [phone]

## 2018-02-06 LAB
ABO + RH BLD: NORMAL
ALBUMIN SERPL-MCNC: 2 G/DL (ref 3.5–5)
ALBUMIN/GLOB SERPL: 0.7 {RATIO} (ref 1.1–2.2)
ALP SERPL-CCNC: 52 U/L (ref 45–117)
ALT SERPL-CCNC: 13 U/L (ref 12–78)
ANION GAP SERPL CALC-SCNC: 7 MMOL/L (ref 5–15)
AST SERPL-CCNC: 17 U/L (ref 15–37)
BILIRUB SERPL-MCNC: 0.3 MG/DL (ref 0.2–1)
BLD PROD TYP BPU: NORMAL
BLOOD GROUP ANTIBODIES SERPL: NORMAL
BPU ID: NORMAL
BUN SERPL-MCNC: 8 MG/DL (ref 6–20)
BUN/CREAT SERPL: 9 (ref 12–20)
CALCIUM SERPL-MCNC: 7.8 MG/DL (ref 8.5–10.1)
CHLORIDE SERPL-SCNC: 110 MMOL/L (ref 97–108)
CO2 SERPL-SCNC: 26 MMOL/L (ref 21–32)
CREAT SERPL-MCNC: 0.88 MG/DL (ref 0.7–1.3)
CROSSMATCH RESULT,%XM: NORMAL
ERYTHROCYTE [DISTWIDTH] IN BLOOD BY AUTOMATED COUNT: 15.3 % (ref 11.5–14.5)
GLOBULIN SER CALC-MCNC: 2.7 G/DL (ref 2–4)
GLUCOSE SERPL-MCNC: 90 MG/DL (ref 65–100)
HCT VFR BLD AUTO: 21.9 % (ref 36.6–50.3)
HGB BLD-MCNC: 7.3 G/DL (ref 12.1–17)
MCH RBC QN AUTO: 30.8 PG (ref 26–34)
MCHC RBC AUTO-ENTMCNC: 33.3 G/DL (ref 30–36.5)
MCV RBC AUTO: 92.4 FL (ref 80–99)
NRBC # BLD: 0 K/UL (ref 0–0.01)
NRBC BLD-RTO: 0 PER 100 WBC
PLATELET # BLD AUTO: 234 K/UL (ref 150–400)
PMV BLD AUTO: 9.7 FL (ref 8.9–12.9)
POTASSIUM SERPL-SCNC: 3.2 MMOL/L (ref 3.5–5.1)
PROT SERPL-MCNC: 4.7 G/DL (ref 6.4–8.2)
RBC # BLD AUTO: 2.37 M/UL (ref 4.1–5.7)
SODIUM SERPL-SCNC: 143 MMOL/L (ref 136–145)
SPECIMEN EXP DATE BLD: NORMAL
STATUS OF UNIT,%ST: NORMAL
UNIT DIVISION, %UDIV: 0
WBC # BLD AUTO: 5.7 K/UL (ref 4.1–11.1)

## 2018-02-06 PROCEDURE — 85027 COMPLETE CBC AUTOMATED: CPT | Performed by: INTERNAL MEDICINE

## 2018-02-06 PROCEDURE — 97535 SELF CARE MNGMENT TRAINING: CPT | Performed by: OCCUPATIONAL THERAPIST

## 2018-02-06 PROCEDURE — 74011250637 HC RX REV CODE- 250/637: Performed by: INTERNAL MEDICINE

## 2018-02-06 PROCEDURE — 65270000029 HC RM PRIVATE

## 2018-02-06 PROCEDURE — 97116 GAIT TRAINING THERAPY: CPT

## 2018-02-06 PROCEDURE — 36415 COLL VENOUS BLD VENIPUNCTURE: CPT | Performed by: INTERNAL MEDICINE

## 2018-02-06 PROCEDURE — 80053 COMPREHEN METABOLIC PANEL: CPT | Performed by: INTERNAL MEDICINE

## 2018-02-06 PROCEDURE — 97530 THERAPEUTIC ACTIVITIES: CPT | Performed by: OCCUPATIONAL THERAPIST

## 2018-02-06 PROCEDURE — 97110 THERAPEUTIC EXERCISES: CPT

## 2018-02-06 RX ADMIN — METRONIDAZOLE 500 MG: 250 TABLET ORAL at 08:25

## 2018-02-06 RX ADMIN — PANTOPRAZOLE SODIUM 40 MG: 40 TABLET, DELAYED RELEASE ORAL at 17:44

## 2018-02-06 RX ADMIN — METRONIDAZOLE 500 MG: 250 TABLET ORAL at 13:25

## 2018-02-06 RX ADMIN — HYDROCORTISONE 30 ML: 1 OINTMENT TOPICAL at 17:45

## 2018-02-06 RX ADMIN — HYDROCORTISONE 30 ML: 1 OINTMENT TOPICAL at 08:25

## 2018-02-06 RX ADMIN — MUPIROCIN: 20 OINTMENT TOPICAL at 20:42

## 2018-02-06 RX ADMIN — HYDROCORTISONE 30 ML: 1 OINTMENT TOPICAL at 13:25

## 2018-02-06 RX ADMIN — METRONIDAZOLE 500 MG: 250 TABLET ORAL at 21:24

## 2018-02-06 RX ADMIN — Medication 10 ML: at 13:25

## 2018-02-06 RX ADMIN — HYDROCORTISONE 30 ML: 1 OINTMENT TOPICAL at 21:25

## 2018-02-06 RX ADMIN — DOXYCYCLINE HYCLATE 100 MG: 100 TABLET, COATED ORAL at 08:24

## 2018-02-06 RX ADMIN — MUPIROCIN: 20 OINTMENT TOPICAL at 08:25

## 2018-02-06 RX ADMIN — Medication 10 ML: at 21:25

## 2018-02-06 RX ADMIN — TAMSULOSIN HYDROCHLORIDE 0.4 MG: 0.4 CAPSULE ORAL at 08:24

## 2018-02-06 RX ADMIN — PANTOPRAZOLE SODIUM 40 MG: 40 TABLET, DELAYED RELEASE ORAL at 08:24

## 2018-02-06 RX ADMIN — DOXYCYCLINE HYCLATE 100 MG: 100 TABLET, COATED ORAL at 20:42

## 2018-02-06 NOTE — PROGRESS NOTES
Occupational Therapy Goals  Initiated 2/4/2018  1. Patient will perform bathing standing at sink for knees and superior, sitting knees and distal with modified independence within 7 day(s). 2.  Patient will perform lower body dressing with independence within 7 day(s). 3.  Patient will perform simple home management (make bed, organize room, item retrieval from floor/closet/cabinets) with independence within 7 day(s). 4.  Patient will perform toilet transfers with independence within 7 day(s). 5.  Patient will perform all aspects of toileting with independence within 7 day(s). 6.  Patient will participate in standing upper extremity therapeutic exercise/activities with independence for 10 minutes without LOB within 7 day(s). 7.  Patient will utilize energy conservation techniques during functional activities with verbal cues within 7 day(s). Occupational Therapy TREATMENT  Patient: Sofía Ta Sr. (77 y.o. male)  Date: 2/6/2018  Diagnosis: Acute blood loss anemia  GI bleed <principal problem not specified>  Procedure(s) (LRB):  ESOPHAGOGASTRODUODENOSCOPY (EGD) (N/A)  ENDOSCOPIC BRUSHING (N/A)  ESOPHAGOGASTRODUODENAL (EGD) BIOPSY (N/A)  RESOLUTION CLIP x 2  (N/A)  INJECTION (N/A)  BICAP (N/A) 4 Days Post-Op  Precautions: Fall    ASSESSMENT:  Patient is progressing well, but continues to be limited by decreased activity tolerance, GW, decreased balance and mildly decreased safety awareness. Increased time and effort required for all functional activity, requiring patient to use pacing techniques this session. Overall he was CGA for transfers and ambulation with a RW, was SBA for standing grooming and was min A for toileting and LB dressing. Min cueing needed for safety during transfers, walker management during mobility and for proper positioning of RW during ADLs and retrieval of LB clothing from drawer. Patient motivated t/o session and receptive to all training and education provided.  At this point he would greatly benefit from inpatient rehab to maximize his functional independence prior to returning home. Progression toward goals:  [x]       Improving appropriately and progressing toward goals  []       Improving slowly and progressing toward goals  []       Not making progress toward goals and plan of care will be adjusted     PLAN:  Patient continues to benefit from skilled intervention to address the above impairments. Continue treatment per established plan of care. Discharge Recommendations:  Inpatient Rehab  Further Equipment Recommendations for Discharge:  TBD         OBJECTIVE DATA SUMMARY:   Cognitive/Behavioral Status:  Neurologic State: Alert  Orientation Level: Oriented X4  Cognition: Appropriate for age attention/concentration; Follows commands  Safety/Judgement: Decreased awareness of need for safety  Functional Mobility and Transfers for ADLs:  Bed Mobility:   Presented up in chair        Scooting: Supervision  Transfers:  Sit to Stand: Contact guard assistance          Toilet Transfer : Contact guard assistance (using grab bar)           Bathroom Mobility: Contact guard assistance (ambulating with a RW)      Balance:  Sitting: Intact; Without support  Standing: Impaired; With support  Standing - Static: Good  Standing - Dynamic : Fair (good with RW)  ADL Intervention:  Grooming  Washing Hands: Stand-by assistance    Lower Body Dressing Assistance  Underpants: Minimum assistance  Socks: Supervision/set-up  Leg Crossed Method Used: Yes  Position Performed: Bending forward method;Seated in chair  Cues: Doff;Don;Verbal cues provided;Visual cues provided    Toileting  Toileting Assistance: Minimum assistance  Clothing Management: Minimum assistance  Adaptive Equipment: Grab bars    Cognitive Retraining  Safety/Judgement: Decreased awareness of need for safety    Pain:  Pain Scale 1: Numeric (0 - 10)  Pain Intensity 1: 0              Activity Tolerance:   Fair.  Increased time and effort for all ADLs and functional mobility      After treatment:   [x] Patient left in no apparent distress sitting up in chair  [] Patient left in no apparent distress in bed  [x] Call bell left within reach  [x] Nursing notified  [] Caregiver present  [] Bed alarm activated    COMMUNICATION/COLLABORATION:   The patients plan of care was discussed with: Physical Therapy Assistant    JOHANNY Church/L  Time Calculation: 25 mins

## 2018-02-06 NOTE — PROGRESS NOTES
Hospitalist Progress Note    NAME: Elmira Barnes Sr.   :  1950   MRN:  [id number]       Assessment / Plan:  Acute Blood Loss Anemia  Duodenal ulcer s/p EGD with Clipping, epi and APC and subsequent embolization in IR on   Upper GIB with Coffee Ground Emesis and Melena  Continue telemetry  s/p 3 units pRBC's on , Continue to monitor H&H  Continue PPIs  Gi is on the case    H pylori  Complete 2 weeks coarse of Doxy, Flagyl, Bismuth and PPIs started this admission    Severe Sepsis on   Lactate improved  PNA ruled out  BCx's remain negative   Patient initially on Meropenem empirically and transitioned to Levaquin and Flagyl on   Now on treatment for H.Pylori    Pulmonary Vascular Congestion on  CXR  Repeat CXR with improvement  Likely related to aggressive IV hydration in the hospice    Acute Pancreatitis: Drinks one bottle of wine nightly  Lipase improved  RUQ ultrasound only with hpatic steatosis, LFT's normal     Orthostatic Hypotension  Continue to monitor orthostatic    Hematuria, Suprapubic Tenderness  Mild renal insufficiency; baseline renal function unknown; improved  Urinary retenetion  Continue flomax  Urology recommended to continue grady's and have OP followup in office     Code Status: Full  Surrogate Decision Maker: Son     DVT Prophylaxis: SCDs     Body mass index is 25.77 kg/(m^2). Inpatient Rehab recommended by PT/OT, referral sent     Subjective: Pt seen and examined at bedside. NAD. No more episode of bleeding.  Overnight events d/w RN     Chief Complaint / Reason for Physician Visit: follow-up GIB    Review of Systems:  Symptom Y/N Comments  Symptom Y/N Comments   Fever/Chills n improved  Chest Pain n    Poor Appetite    Edema n    Cough    Abdominal Pain     Sputum    Joint Pain     SOB/EVERETT n   Pruritis/Rash     Nausea/vomit n   Tolerating PT/OT     Diarrhea n   Tolerating Diet     Constipation n   Other       Could NOT obtain due to:      Objective:     VITALS:   Last 24hrs VS reviewed since prior progress note. Most recent are:  Patient Vitals for the past 24 hrs:   Temp Pulse Resp BP SpO2   02/06/18 0756 98.6 °F (37 °C) 75 20 98/62 94 %   02/06/18 0404 98.4 °F (36.9 °C) 84 18 103/57 98 %   02/05/18 2352 98.3 °F (36.8 °C) 86 18 103/61 94 %   02/05/18 1934 98.3 °F (36.8 °C) (!) 104 18 115/72 100 %   02/05/18 1712 97.4 °F (36.3 °C) 82 18 114/57 100 %   02/05/18 1235 98.5 °F (36.9 °C) 88 12 104/50 98 %       Intake/Output Summary (Last 24 hours) at 02/06/18 0942  Last data filed at 02/06/18 0743   Gross per 24 hour   Intake              240 ml   Output             1475 ml   Net            -1235 ml        PHYSICAL EXAM:  General: WD, WN. Alert, cooperative, no acute distress    EENT:  EOMI. Anicteric sclerae. MMM  Resp:  CTA bilaterally, no wheezing or rales. No accessory muscle use but frequent coughing  CV:  RRR,  No edema  GI:  Soft, less distended, non-tender. Bowel sounds present  Neurologic:  Alert and oriented X 3, normal speech,   Psych:   Good insight. Not anxious nor agitated  Skin:  No rashes. No jaundice. Pale    Reviewed most current lab test results and cultures  YES  Reviewed most current radiology test results   YES  Review and summation of old records today    NO  Reviewed patient's current orders and MAR    YES  PMH/ reviewed - no change compared to H&P  ________________________________________________________________________  Care Plan discussed with:    Comments   Patient x    Family      RN x    Care Manager x    Consultant                        Multidiciplinary team rounds were held today with , nursing, pharmacist and clinical coordinator. Patient's plan of care was discussed; medications were reviewed and discharge planning was addressed.      ________________________________________________________________________  Total NON critical care TIME:  30    Total CRITICAL CARE TIME Spent:  Minutes non procedure based      Comments   >50% of visit spent in counseling and coordination of care x Chart review   ________________________________________________________________________  Maria C Mccord MD     Procedures: see electronic medical records for all procedures/Xrays and details which were not copied into this note but were reviewed prior to creation of Plan. LABS:  I reviewed today's most current labs and imaging studies.   Pertinent labs include:  Recent Labs      02/06/18   0302  02/05/18   0500  02/04/18   1202  02/04/18   0530   WBC  5.7  7.7   --   8.5   HGB  7.3*  7.6*  8.2*  7.8*   HCT  21.9*  22.6*  24.5*  23.0*   PLT  234  215   --   182     Recent Labs      02/06/18   0302  02/05/18   0500  02/04/18   0530   NA  143  145  146*   K  3.2*  3.4*  3.6   CL  110*  113*  116*   CO2  26  25  25   GLU  90  96  96   BUN  8  7  14   CREA  0.88  1.00  1.14   CA  7.8*  7.7*  7.9*   ALB  2.0*  2.0*  2.1*   TBILI  0.3  0.5  0.5   SGOT  17  18  22   ALT  13  11*  13       Signed: Maria C Mccord MD

## 2018-02-06 NOTE — PROGRESS NOTES
General Surgery End of Shift Nursing Note    Bedside shift change report given to Rita Nur RN (oncoming nurse) by Iris Menendez. Yin Vilchis RN (offgoing nurse). Report included the following information SBAR, Kardex, Intake/Output, MAR and Recent Results. Shift worked:   7a to [employer] of shift:    Tolerating the day   Issues for physician to address:   none     Number times ambulated in hallway past shift: in room    Number of times OOB to chair past shift: 2    Pain Management:  Current medication: none  Patient states pain is manageable on current pain medication: YES    GI:    Current diet:  DIET REGULAR    Tolerating current diet: YES  Passing flatus: YES  Last Bowel Movement: today   Appearance: soft    Respiratory:    Incentive Spirometer at bedside: YES  Patient instructed on use: YES    Patient Safety:    Falls Score: 3  Bed Alarm On? YES  Sitter?  No    Cammy Talley, PANCHITO

## 2018-02-06 NOTE — PROGRESS NOTES
Problem: Mobility Impaired (Adult and Pediatric)  Goal: *Acute Goals and Plan of Care (Insert Text)  Physical Therapy Goals  Initiated 2/4/2018  1. Patient will move from supine to sit and sit to supine , scoot up and down and roll side to side in bed with independence within 7 day(s). 2.  Patient will transfer from bed to chair and chair to bed with modified independence using the least restrictive device within 7 day(s). 3.  Patient will perform sit to stand with independence within 7 day(s). 4.  Patient will ambulate with modified independence for 100 feet with the least restrictive device within 7 day(s). 5.  Patient will ascend/descend 15 stairs with single handrail(s) with supervision/set-up within 7 day(s). physical Therapy TREATMENT  Patient: Caleb Prado Sr. (77 y.o. male)  Date: 2/6/2018  Diagnosis: Acute blood loss anemia, GI bleed     Procedure(s) (LRB):  ESOPHAGOGASTRODUODENOSCOPY (EGD) (N/A)  ENDOSCOPIC BRUSHING (N/A)  ESOPHAGOGASTRODUODENAL (EGD) BIOPSY (N/A)  RESOLUTION CLIP x 2  (N/A)  INJECTION (N/A)  BICAP (N/A) 4 Days Post-Op     Precautions: Fall  Chart, physical therapy assessment, plan of care and goals were reviewed. ASSESSMENT: pt progressing towards goals, no LOB (with RW), no SOB, does fair with transfers, and good with ther-ex, good motivation, vc's for safety and proper RW use. Progression toward goals:  []      Improving appropriately and progressing toward goals  [x]      Improving slowly and progressing toward goals  []      Not making progress toward goals and plan of care will be adjusted     PLAN:  Patient continues to benefit from skilled intervention to address the above impairments. Continue treatment per established plan of care.   Discharge Recommendations:  Inpatient Rehab  Further Equipment Recommendations for Discharge:  rolling walker     OBJECTIVE DATA SUMMARY:     Functional Mobility Training:  Bed Mobility: pt siting in chair on arrival    Transfers:  Sit to Stand: Contact guard assistance;Minimum assistance  Stand to Sit: Contact guard assistance  Interventions: Tactile cues; Verbal cues  Level of Assistance: Contact guard assistance     Balance:  Sitting: Intact; Without support  Standing: Impaired; With support  Standing - Static: Constant support; Fair  Standing - Dynamic : Fair (good with RW)     Ambulation/Gait Training:  Distance (ft): 70 Feet (ft)  Assistive Device: Gait belt;Walker, rolling  Ambulation - Level of Assistance: Contact guard assistance (with RW)  Gait Abnormalities: Decreased step clearance  Right Side Weight Bearing: Full  Left Side Weight Bearing: Full  Base of Support: Narrowed  Speed/Jaimie: Slow  Step Length: Left shortened;Right shortened    Therapeutic Exercises:   sitting  EXERCISE   Sets   Reps   Active Active Assist   Passive   Comments   Ankle pumps 1 10 [x] [] [] bilat   Heel raises 1 10 [x] [] [] \"   Toe tap 1 10 [x] [] [] \"   Knee ext 1 10 [x] [] [] \"   Hip flex 1 10 [x] [] [] \"   Abd & Add 1 10 [x] [] [] \"     Pain:  Pain Scale 1: Numeric (0 - 10)  Pain Intensity 1: 0    Activity Tolerance: fair    After treatment:   [x] Patient left in no apparent distress sitting up in chair  [] Patient left in no apparent distress in bed  [x] Call bell left within reach  [x] Nursing notified  [] Caregiver present  [] Bed alarm activated    COMMUNICATION/COLLABORATION:   The patients plan of care was discussed with: Registered Nurse    Mark Huynh PTA   Time Calculation: 25 mins

## 2018-02-06 NOTE — PROGRESS NOTES
Gastroenterology Daily Progress Note (Dr. Colt Gomez)   [facility]    Admit Date: 2/2/2018       Subjective:       Stool dark on the pepto bismol. No c/o this am.  No abd pain. Current Facility-Administered Medications   Medication Dose Route Frequency    pantoprazole (PROTONIX) tablet 40 mg  40 mg Oral ACB&D    bismuth subsalicylate (PEPTO-BISMOL) oral suspension 30 mL  30 mL Oral QID    doxycycline (VIBRA-TABS) tablet 100 mg  100 mg Oral Q12H    metroNIDAZOLE (FLAGYL) tablet 500 mg  500 mg Oral Q8H    tamsulosin (FLOMAX) capsule 0.4 mg  0.4 mg Oral DAILY    acetaminophen (TYLENOL) tablet 500 mg  500 mg Oral Q4H PRN    sodium chloride (NS) flush 5-10 mL  5-10 mL IntraVENous Q8H    sodium chloride (NS) flush 5-10 mL  5-10 mL IntraVENous PRN    ondansetron (ZOFRAN) injection 4 mg  4 mg IntraVENous Q4H PRN    mupirocin (BACTROBAN) 2 % ointment   Both Nostrils Q12H        Objective:     Visit Vitals    /64 (BP 1 Location: Right arm, BP Patient Position: Supine)    Pulse 86    Temp 98.2 °F (36.8 °C)    Resp 20    Ht 6' (1.829 m)    Wt 86.2 kg (190 lb)    SpO2 97%    BMI 25.77 kg/m2   Blood pressure 119/64, pulse 86, temperature 98.2 °F (36.8 °C), resp. rate 20, height 6' (1.829 m), weight 86.2 kg (190 lb), SpO2 97 %. 02/06 0701 - 02/06 1900  In: 240 [P.O.:240]  Out: 625 [Urine:625]    02/04 1901 - 02/06 0700  In: 240 [P.O.:240]  Out: 8849 [Urine:2575]      Intake/Output Summary (Last 24 hours) at 02/06/18 1409  Last data filed at 02/06/18 1326   Gross per 24 hour   Intake              240 ml   Output             1825 ml   Net            -1585 ml     Physical Exam:     General: awake and alert BM in NAD  Chest:  CTA, No rhonchi, rales or rubs. Heart: S1, S2, RRR  GI: Soft, NT, ND + bowel sounds      Labs:        Ref.  Range 2/4/2018 05:30 2/4/2018 09:17 2/4/2018 12:02 2/5/2018 05:00 2/6/2018 03:02 2/7/2018 02:54   HGB Latest Ref Range: 12.1 - 17.0 g/dL 7.8 (L)  8.2 (L) 7.6 (L) 7.3 (L) 7.5 (L)   HCT Latest Ref Range: 36.6 - 50.3 % 23.0 (L)  24.5 (L) 22.6 (L) 21.9 (L) 21.9 (L)     Recent Results (from the past 24 hour(s))   CBC W/O DIFF    Collection Time: 02/06/18  3:02 AM   Result Value Ref Range    WBC 5.7 4.1 - 11.1 K/uL    RBC 2.37 (L) 4.10 - 5.70 M/uL    HGB 7.3 (L) 12.1 - 17.0 g/dL    HCT 21.9 (L) 36.6 - 50.3 %    MCV 92.4 80.0 - 99.0 FL    MCH 30.8 26.0 - 34.0 PG    MCHC 33.3 30.0 - 36.5 g/dL    RDW 15.3 (H) 11.5 - 14.5 %    PLATELET 785 337 - 081 K/uL    MPV 9.7 8.9 - 12.9 FL    NRBC 0.0 0  WBC    ABSOLUTE NRBC 0.00 0.00 - 4.58 K/uL   METABOLIC PANEL, COMPREHENSIVE    Collection Time: 02/06/18  3:02 AM   Result Value Ref Range    Sodium 143 136 - 145 mmol/L    Potassium 3.2 (L) 3.5 - 5.1 mmol/L    Chloride 110 (H) 97 - 108 mmol/L    CO2 26 21 - 32 mmol/L    Anion gap 7 5 - 15 mmol/L    Glucose 90 65 - 100 mg/dL    BUN 8 6 - 20 MG/DL    Creatinine 0.88 0.70 - 1.30 MG/DL    BUN/Creatinine ratio 9 (L) 12 - 20      GFR est AA >60 >60 ml/min/1.73m2    GFR est non-AA >60 >60 ml/min/1.73m2    Calcium 7.8 (L) 8.5 - 10.1 MG/DL    Bilirubin, total 0.3 0.2 - 1.0 MG/DL    ALT (SGPT) 13 12 - 78 U/L    AST (SGOT) 17 15 - 37 U/L    Alk.  phosphatase 52 45 - 117 U/L    Protein, total 4.7 (L) 6.4 - 8.2 g/dL    Albumin 2.0 (L) 3.5 - 5.0 g/dL    Globulin 2.7 2.0 - 4.0 g/dL    A-G Ratio 0.7 (L) 1.1 - 2.2       Recent Labs      02/06/18   0302  02/05/18   0500  02/04/18   0530   NA  143  145  146*   K  3.2*  3.4*  3.6   CL  110*  113*  116*   CO2  26  25  25   BUN  8  7  14   CREA  0.88  1.00  1.14   GLU  90  96  96   CA  7.8*  7.7*  7.9*     Recent Labs      02/06/18   0302  02/05/18   0500  02/04/18   0530   SGOT  17  18  22   AP  52  53  53   TP  4.7*  4.7*  4.6*   ALB  2.0*  2.0*  2.1*   GLOB  2.7  2.7  2.5       Impression:    Acute GI blood loss anemia  Duodenal ulcer with hemorrhage s/p EGD then angioembolization  H pylori +         Plan:  No further bleeding since successful angio with embolization.    -continue H pylori tx for 14 days  -continue BID PPI at f/u  -f/u with me post discharge to discuss eventual colonoscopy and testing to confirm H pylori eradication  -signing off, please call with questions       Hira Garcia MD    2/6/2018  3500  [address]  [phone]  [address] [place] [address] [place]: [phone]

## 2018-02-06 NOTE — PROGRESS NOTES
CM was informed that [facility] Valdes has begin for pt. CM will be informed when Bill Valdes has been received and a bed becomes available.     LU Rodrigues CM  [phone]

## 2018-02-07 ENCOUNTER — HOSPITAL ENCOUNTER (OUTPATIENT)
Age: 68
Discharge: HOME | End: 2018-02-14
Attending: PHYSICAL MEDICINE & REHABILITATION | Admitting: PHYSICAL MEDICINE & REHABILITATION

## 2018-02-07 VITALS
RESPIRATION RATE: 18 BRPM | HEART RATE: 81 BPM | SYSTOLIC BLOOD PRESSURE: 119 MMHG | WEIGHT: 190 LBS | HEIGHT: 72 IN | TEMPERATURE: 98.3 F | BODY MASS INDEX: 25.73 KG/M2 | OXYGEN SATURATION: 98 % | DIASTOLIC BLOOD PRESSURE: 60 MMHG

## 2018-02-07 VITALS — BODY MASS INDEX: 27.09 KG/M2 | HEIGHT: 72 IN | WEIGHT: 200 LBS

## 2018-02-07 LAB
APPEARANCE UR: ABNORMAL
BACTERIA SPEC CULT: NORMAL
BACTERIA URNS QL MICRO: ABNORMAL /HPF
BILIRUB UR QL: NEGATIVE
CAOX CRY URNS QL MICRO: ABNORMAL
COLOR UR: ABNORMAL
EPITH CASTS URNS QL MICRO: ABNORMAL /LPF
GLUCOSE UR STRIP.AUTO-MCNC: NEGATIVE MG/DL
HCT VFR BLD AUTO: 21.9 % (ref 36.6–50.3)
HGB BLD-MCNC: 7.5 G/DL (ref 12.1–17)
HGB UR QL STRIP: ABNORMAL
KETONES UR QL STRIP.AUTO: NEGATIVE MG/DL
LEUKOCYTE ESTERASE UR QL STRIP.AUTO: ABNORMAL
NITRITE UR QL STRIP.AUTO: NEGATIVE
PH UR STRIP: 6 [PH] (ref 5–8)
PROT UR STRIP-MCNC: NEGATIVE MG/DL
RBC #/AREA URNS HPF: ABNORMAL /HPF (ref 0–5)
SERVICE CMNT-IMP: NORMAL
SP GR UR REFRACTOMETRY: 1.02 (ref 1–1.03)
UROBILINOGEN UR QL STRIP.AUTO: 0.2 EU/DL (ref 0.2–1)
WBC URNS QL MICRO: ABNORMAL /HPF (ref 0–4)

## 2018-02-07 PROCEDURE — 74011250637 HC RX REV CODE- 250/637: Performed by: EMERGENCY MEDICINE

## 2018-02-07 PROCEDURE — 36415 COLL VENOUS BLD VENIPUNCTURE: CPT | Performed by: INTERNAL MEDICINE

## 2018-02-07 PROCEDURE — 97110 THERAPEUTIC EXERCISES: CPT

## 2018-02-07 PROCEDURE — 97116 GAIT TRAINING THERAPY: CPT

## 2018-02-07 PROCEDURE — 81001 URINALYSIS AUTO W/SCOPE: CPT | Performed by: PHYSICAL MEDICINE & REHABILITATION

## 2018-02-07 PROCEDURE — 74011250637 HC RX REV CODE- 250/637: Performed by: PHYSICAL MEDICINE & REHABILITATION

## 2018-02-07 PROCEDURE — 74011250637 HC RX REV CODE- 250/637: Performed by: INTERNAL MEDICINE

## 2018-02-07 PROCEDURE — 87086 URINE CULTURE/COLONY COUNT: CPT | Performed by: PHYSICAL MEDICINE & REHABILITATION

## 2018-02-07 PROCEDURE — 85018 HEMOGLOBIN: CPT | Performed by: INTERNAL MEDICINE

## 2018-02-07 RX ORDER — ADHESIVE BANDAGE
30 BANDAGE TOPICAL DAILY PRN
Status: DISCONTINUED | OUTPATIENT
Start: 2018-02-07 | End: 2018-02-14 | Stop reason: HOSPADM

## 2018-02-07 RX ORDER — METRONIDAZOLE 250 MG/1
500 TABLET ORAL EVERY 8 HOURS
Status: DISCONTINUED | OUTPATIENT
Start: 2018-02-07 | End: 2018-02-07 | Stop reason: HOSPADM

## 2018-02-07 RX ORDER — PANTOPRAZOLE SODIUM 40 MG/1
40 TABLET, DELAYED RELEASE ORAL
Status: DISCONTINUED | OUTPATIENT
Start: 2018-02-08 | End: 2018-02-07 | Stop reason: HOSPADM

## 2018-02-07 RX ORDER — METRONIDAZOLE 500 MG/1
500 TABLET ORAL EVERY 8 HOURS
Qty: 36 TAB | Refills: 0 | Status: SHIPPED
Start: 2018-02-07 | End: 2018-02-19

## 2018-02-07 RX ORDER — BISMUTH SUBSALICYLATE 262 MG/15ML
30 LIQUID ORAL 4 TIMES DAILY
Status: DISCONTINUED | OUTPATIENT
Start: 2018-02-07 | End: 2018-02-10 | Stop reason: ALTCHOICE

## 2018-02-07 RX ORDER — BISMUTH SUBSALICYLATE 262 MG/15ML
30 LIQUID ORAL 4 TIMES DAILY
Status: DISCONTINUED | OUTPATIENT
Start: 2018-02-07 | End: 2018-02-07 | Stop reason: HOSPADM

## 2018-02-07 RX ORDER — DOXYCYCLINE HYCLATE 100 MG
100 TABLET ORAL EVERY 12 HOURS
Qty: 24 TAB | Refills: 0 | Status: SHIPPED
Start: 2018-02-07 | End: 2018-02-19

## 2018-02-07 RX ORDER — TAMSULOSIN HYDROCHLORIDE 0.4 MG/1
0.4 CAPSULE ORAL DAILY
Status: DISCONTINUED | OUTPATIENT
Start: 2018-02-08 | End: 2018-02-07 | Stop reason: HOSPADM

## 2018-02-07 RX ORDER — TAMSULOSIN HYDROCHLORIDE 0.4 MG/1
0.4 CAPSULE ORAL DAILY
Qty: 30 CAP | Refills: 0 | Status: SHIPPED
Start: 2018-02-08

## 2018-02-07 RX ORDER — DOXYCYCLINE HYCLATE 100 MG
100 TABLET ORAL EVERY 12 HOURS
Status: DISCONTINUED | OUTPATIENT
Start: 2018-02-07 | End: 2018-02-14 | Stop reason: HOSPADM

## 2018-02-07 RX ORDER — METRONIDAZOLE 250 MG/1
500 TABLET ORAL EVERY 8 HOURS
Status: DISCONTINUED | OUTPATIENT
Start: 2018-02-07 | End: 2018-02-14 | Stop reason: HOSPADM

## 2018-02-07 RX ORDER — ACETAMINOPHEN 325 MG/1
650 TABLET ORAL
Status: DISCONTINUED | OUTPATIENT
Start: 2018-02-07 | End: 2018-02-14 | Stop reason: HOSPADM

## 2018-02-07 RX ORDER — BISMUTH SUBSALICYLATE 262 MG/15ML
30 LIQUID ORAL 4 TIMES DAILY
Qty: 1440 ML | Refills: 0 | Status: SHIPPED
Start: 2018-02-07 | End: 2018-02-19

## 2018-02-07 RX ORDER — TAMSULOSIN HYDROCHLORIDE 0.4 MG/1
0.4 CAPSULE ORAL DAILY
Status: DISCONTINUED | OUTPATIENT
Start: 2018-02-08 | End: 2018-02-14 | Stop reason: HOSPADM

## 2018-02-07 RX ORDER — PANTOPRAZOLE SODIUM 40 MG/1
40 TABLET, DELAYED RELEASE ORAL
Qty: 60 TAB | Refills: 0 | Status: SHIPPED
Start: 2018-02-07 | End: 2018-03-09

## 2018-02-07 RX ORDER — MUPIROCIN 20 MG/G
OINTMENT TOPICAL EVERY 12 HOURS
Status: DISCONTINUED | OUTPATIENT
Start: 2018-02-07 | End: 2018-02-10 | Stop reason: ALTCHOICE

## 2018-02-07 RX ORDER — PANTOPRAZOLE SODIUM 40 MG/1
40 TABLET, DELAYED RELEASE ORAL 2 TIMES DAILY
Status: DISCONTINUED | OUTPATIENT
Start: 2018-02-08 | End: 2018-02-14 | Stop reason: HOSPADM

## 2018-02-07 RX ORDER — PANTOPRAZOLE SODIUM 40 MG/1
40 TABLET, DELAYED RELEASE ORAL
Status: COMPLETED | OUTPATIENT
Start: 2018-02-08 | End: 2018-02-07

## 2018-02-07 RX ORDER — DOXYCYCLINE HYCLATE 100 MG
100 TABLET ORAL EVERY 12 HOURS
Status: DISCONTINUED | OUTPATIENT
Start: 2018-02-07 | End: 2018-02-07 | Stop reason: HOSPADM

## 2018-02-07 RX ORDER — ACETAMINOPHEN 500 MG
500 TABLET ORAL
Qty: 30 TAB | Refills: 0 | Status: SHIPPED
Start: 2018-02-07

## 2018-02-07 RX ORDER — MUPIROCIN 20 MG/G
OINTMENT TOPICAL EVERY 12 HOURS
Status: DISCONTINUED | OUTPATIENT
Start: 2018-02-07 | End: 2018-02-07 | Stop reason: HOSPADM

## 2018-02-07 RX ORDER — ONDANSETRON 4 MG/1
4 TABLET, ORALLY DISINTEGRATING ORAL
Status: DISCONTINUED | OUTPATIENT
Start: 2018-02-07 | End: 2018-02-14 | Stop reason: HOSPADM

## 2018-02-07 RX ADMIN — TAMSULOSIN HYDROCHLORIDE 0.4 MG: 0.4 CAPSULE ORAL at 08:31

## 2018-02-07 RX ADMIN — HYDROCORTISONE 30 ML: 1 OINTMENT TOPICAL at 13:47

## 2018-02-07 RX ADMIN — ACETAMINOPHEN 500 MG: 500 TABLET ORAL at 11:55

## 2018-02-07 RX ADMIN — HYDROCORTISONE 30 ML: 1 OINTMENT TOPICAL at 08:32

## 2018-02-07 RX ADMIN — METRONIDAZOLE 500 MG: 250 TABLET ORAL at 13:46

## 2018-02-07 RX ADMIN — Medication 10 ML: at 13:46

## 2018-02-07 RX ADMIN — BISMUTH SUBSALICYLATE 30 ML: 262 LIQUID ORAL at 23:32

## 2018-02-07 RX ADMIN — MUPIROCIN: 20 OINTMENT TOPICAL at 23:32

## 2018-02-07 RX ADMIN — DOXYCYCLINE HYCLATE 100 MG: 100 TABLET, COATED ORAL at 08:31

## 2018-02-07 RX ADMIN — METRONIDAZOLE 500 MG: 250 TABLET ORAL at 23:31

## 2018-02-07 RX ADMIN — Medication 10 ML: at 06:12

## 2018-02-07 RX ADMIN — DOXYCYCLINE HYCLATE 100 MG: 100 TABLET, COATED ORAL at 23:31

## 2018-02-07 RX ADMIN — PANTOPRAZOLE SODIUM 40 MG: 40 TABLET, DELAYED RELEASE ORAL at 17:20

## 2018-02-07 RX ADMIN — HYDROCORTISONE 30 ML: 1 OINTMENT TOPICAL at 17:22

## 2018-02-07 RX ADMIN — MUPIROCIN: 20 OINTMENT TOPICAL at 08:32

## 2018-02-07 RX ADMIN — METRONIDAZOLE 500 MG: 250 TABLET ORAL at 06:13

## 2018-02-07 RX ADMIN — PANTOPRAZOLE SODIUM 40 MG: 40 TABLET, DELAYED RELEASE ORAL at 23:32

## 2018-02-07 RX ADMIN — PANTOPRAZOLE SODIUM 40 MG: 40 TABLET, DELAYED RELEASE ORAL at 08:31

## 2018-02-07 NOTE — PROGRESS NOTES
Nutrition Assessment:    RECOMMENDATIONS:   Continue Regular diet    DIETITIANS INTERVENTIONS/PLAN:   Continue diet as tolerated  Monitor appetite/PO intake    ASSESSMENT:   Pt admitted with GI bleed and anemia. PMH: ETOH intake. Chart reviewed for LOS. Duodenal ulcer was found and pt is s/p EGD and embolization. Discharge planning is underway. He is on a regular diet and has an excellent appetite. MST negative for previous nutritional risk factors. Labs reviewed. BM noted today. Current intake is likely adequate to meet est needs. SUBJECTIVE/OBJECTIVE:     Diet Order: Regular  % Eaten:  Patient Vitals for the past 72 hrs:   % Diet Eaten   02/06/18 1326 100 %   02/06/18 0743 100 %   02/05/18 1306 100 %   02/05/18 0803 100 %     Pertinent Medications:peptobismol, doxycycline, flagyl, protonix. Chemistries:  Lab Results   Component Value Date/Time    Sodium 143 02/06/2018 03:02 AM    Potassium 3.2 (L) 02/06/2018 03:02 AM    Chloride 110 (H) 02/06/2018 03:02 AM    CO2 26 02/06/2018 03:02 AM    Anion gap 7 02/06/2018 03:02 AM    Glucose 90 02/06/2018 03:02 AM    BUN 8 02/06/2018 03:02 AM    Creatinine 0.88 02/06/2018 03:02 AM    BUN/Creatinine ratio 9 (L) 02/06/2018 03:02 AM    GFR est AA >60 02/06/2018 03:02 AM    GFR est non-AA >60 02/06/2018 03:02 AM    Calcium 7.8 (L) 02/06/2018 03:02 AM    AST (SGOT) 17 02/06/2018 03:02 AM    Alk. phosphatase 52 02/06/2018 03:02 AM    Protein, total 4.7 (L) 02/06/2018 03:02 AM    Albumin 2.0 (L) 02/06/2018 03:02 AM    Globulin 2.7 02/06/2018 03:02 AM    A-G Ratio 0.7 (L) 02/06/2018 03:02 AM    ALT (SGPT) 13 02/06/2018 03:02 AM      Anthropometrics: Height: 6' (182.9 cm) Weight: 86.2 kg (190 lb)  []bed scale    [x]stated(2/2)   []unknown    IBW (%IBW):   ( ) UBW (%UBW):   (  %)    BMI: Body mass index is 25.77 kg/(m^2).     This BMI is indicative of:  []Underweight   [x]Normal(for age)   []Overweight   [] Obesity   [] Extreme Obesity (BMI>40)  Estimated Nutrition Needs (Based on): 2171 Kcals/day (MSJ 1670 x 1.3) , 69 g (0.8gPro/kg) Protein  Carbohydrate: At Least 130 g/day  Fluids: 2100 mL/day    Last BM: 2/7   []Active     []Hyperactive  [x]Hypoactive       [] Absent   BS  Skin:    [x] Intact   [] Incision  [] Breakdown   [] DTI   [] Tears/Excoriation/Abrasion  [x]Edema(+6-0uvpiuwrvvr-IOX; +2-3 nonpitting-RLE) [] Other: Wt Readings from Last 30 Encounters:   02/02/18 86.2 kg (190 lb)   08/28/10 88.9 kg (196 lb)      NUTRITION DIAGNOSES:   Problem:  No nutritional diagnosis at this time        NUTRITION INTERVENTIONS:  Meals/Snacks: General/healthful diet                  GOAL:   Pt will consume >70% of meals in 5-7 days.      Cultural, Yarsanism, or Ethnic Dietary Needs: None     LEARNING NEEDS (Diet, Food/Nutrient-Drug Interaction):    [x] None Identified   [] Identified and Education Provided/Documented   [] Identified and Pt declined/was not appropriate      [x] Interdisciplinary Care Plan Reviewed/Documented    [x] Participated in Discharge Planning: Regular diet    [] Interdisciplinary Rounds     NUTRITION RISK:    [] High              [] Moderate           []  Low  [x]  Minimal/Uncompromised    PT SEEN FOR:    []  MD Consult: []Calorie Count      []Diabetic Diet Education        []Diet Education     []Electrolyte Management     []General Nutrition Management and Supplements     []Management of Tube Feeding     []TPN Recommendations    []  RN Referral:  []MST score >=2     []Enteral/Parenteral Nutrition PTA     []Pregnant: Gestational DM or Multigestation   []  Low BMI  []  Re-Screen   [x]  [facility], [address], [address] [place] [address]    Pager [phone]  Weekend Pager [phone]

## 2018-02-07 NOTE — PROGRESS NOTES
CM was informed that pt received auth to receive services from [facility]. CM will update pt and MD. It was reported that beds are available today.     LU Combs CM  [phone]

## 2018-02-07 NOTE — PROGRESS NOTES
[facility] [address] [place] [address].                                                                        76 y.o.   male    [facility]   Room: 2120/01    [facility]  Unit Phone# :  [phone]      [address]  [facility] [place] [facility] [address]  [address] [address]  Dept: [phone]  Loc: [phone]                    SITUATION     Admitted:  2/2/2018         Attending Provider:  Evgeny Lundberg MD       Consultations:  IP CONSULT TO GASTROENTEROLOGY  IP CONSULT TO UROLOGY    PCP:  Krystin Julian MD   [phone]    Treatment Team: Attending Provider: Evgeny Lundberg MD; Consulting Provider: Alvin Ku MD; Consulting Provider: Ramírez Grigsby MD; Utilization Review: Stepan Nesbitt    Admitting Dx:  Acute blood loss anemia  GI bleed       Principal Problem: <principal problem not specified>    5 Days Post-Op of   Procedure(s):  ESOPHAGOGASTRODUODENOSCOPY (EGD)  ENDOSCOPIC BRUSHING  ESOPHAGOGASTRODUODENAL (EGD) BIOPSY  RESOLUTION CLIP x 2   INJECTION  BICAP   BY: Alvin Ku MD             ON: 2/2/2018                  Code Status: Full Code                Advance Directives:   Advance Care Planning 2/2/2018   Patient's Healthcare Decision Maker is: Legal Next of Kin   Confirm Advance Directive Yes, not on file    (Send w/patient)   No Doesnt Have       Isolation:  There are currently no Active Isolations       MDRO: No current active infections    Pain Medications given:  tylenol    Last dose: 2/7/2018 at  [facility] needed: no  Type of equipment:      (Not currently on dialysis)  (Not currently on dialysis)  (Not currently on dialysis)     BACKGROUND     Allergies: Allergies   Allergen Reactions    Pcn [Penicillins] Anaphylaxis     Family history       History reviewed. No pertinent past medical history.     Past Surgical History:   Procedure Laterality Date    HX COLONOSCOPY N/A 08/2013    HX PROSTATECTOMY Bx January 2018       Prescriptions Prior to Admission   Medication Sig    aspirin 81 mg chewable tablet Take 81 mg by mouth daily. Hard scripts included in transfer packet {yes/ no default no:64152::\"no\"}    Vaccinations: There is no immunization history on file for this patient. The Charlson [employer] Index tool is an evidenced based tool that has more automatic generated information. The tool looks at many different items such as the age of the patient, how many times they were admitted in the last calendar year, current length of stay in the hospital and their diagnosis. All of these items are pulled automatically from information documented in the chart from various places and will generate a score that predicts whether a patient is at low (less than 13), medium (13-20) or high (21 or greater) risk of being readmitted.         ASSESSMENT                Temp: 98.3 °F (36.8 °C) (02/07/18 1248) Pulse (Heart Rate): 81 (02/07/18 1248)     Resp Rate: 18 (02/07/18 1248)           BP: 119/60 (02/07/18 1251)     O2 Sat (%): 98 % (02/07/18 1248)     Weight: 86.2 kg (190 lb)    Height: 6' (182.9 cm) (02/02/18 0206)       If above not within 1 hour of discharge:    BP:_____  P:____  R:____ T:_____ O2 Sat: ___%  O2: ______    Active Orders   Diet    DIET REGULAR         Orientation: oriented to time, place, person and situation     Active Behaviors: None                                   Active Lines/Drains:  (Peg Tube / [place] / CL or S/L?): yes    Urinary Status: [place]     Last BM: Last Bowel Movement Date: 02/07/18     Skin Integrity: Scars (comment)             Mobility: No limitations   Weight Bearing Status: WBAT (Weight Bearing as Tolerated)      Gait Training  Assistive Device: Gait belt, Walker, rolling  Ambulation - Level of Assistance: Contact guard assistance (with RW)  Distance (ft): 80 Feet (ft)         Lab Results   Component Value Date/Time    Glucose 90 02/06/2018 03:02 AM    INR 1.1 02/02/2018 05:59 AM    HGB 7.5 (L) 02/07/2018 02:54 AM    HGB 7.3 (L) 02/06/2018 03:02 AM        RECOMMENDATION     See After Visit Summary (AVS) for:  · Discharge instructions  · After [address]   · Special equipment needed (entered pre-discharge by Care Management)  · Medication Reconciliation    · Follow up Appointment(s)         Report given/sent by:  Miroslava Varela                    Verbal report given to: Lino Espinosa        Estimated discharge time:  2/7/2018 at [facility]

## 2018-02-07 NOTE — PROGRESS NOTES
Problem: Mobility Impaired (Adult and Pediatric)  Goal: *Acute Goals and Plan of Care (Insert Text)  Physical Therapy Goals  Initiated 2/4/2018  1. Patient will move from supine to sit and sit to supine , scoot up and down and roll side to side in bed with independence within 7 day(s). 2.  Patient will transfer from bed to chair and chair to bed with modified independence using the least restrictive device within 7 day(s). 3.  Patient will perform sit to stand with independence within 7 day(s). 4.  Patient will ambulate with modified independence for 100 feet with the least restrictive device within 7 day(s). 5.  Patient will ascend/descend 15 stairs with single handrail(s) with supervision/set-up within 7 day(s). physical Therapy TREATMENT  Patient: Severa Brush Sr. (77 y.o. male)  Date: 2/7/2018  Diagnosis: Acute blood loss anemia, GI bleed     Procedure(s) (LRB):  ESOPHAGOGASTRODUODENOSCOPY (EGD) (N/A)  ENDOSCOPIC BRUSHING (N/A)  ESOPHAGOGASTRODUODENAL (EGD) BIOPSY (N/A)  RESOLUTION CLIP x 2  (N/A)  INJECTION (N/A)  BICAP (N/A) 5 Days Post-Op     Precautions: Fall  Chart, physical therapy assessment, plan of care and goals were reviewed. ASSESSMENT: pt not doing as well today 2/2 to fatigue and back pain, no LOB or SOB, did fair with transfers, good motivation, did well with ther-ex, vc's for safety and proper RW use. Progression toward goals:  []      Improving appropriately and progressing toward goals  [x]      Improving slowly and progressing toward goals  []      Not making progress toward goals and plan of care will be adjusted     PLAN:  Patient continues to benefit from skilled intervention to address the above impairments. Continue treatment per established plan of care.   Discharge Recommendations:  Inpatient Rehab  Further Equipment Recommendations for Discharge:  rolling walker     OBJECTIVE DATA SUMMARY:     Critical Behavior:  Neurologic State: Alert  Orientation Level: Oriented X4  Cognition: Appropriate for age attention/concentration, Follows commands, Poor safety awareness  Safety/Judgement: Decreased awareness of need for safety     Functional Mobility Training:  Bed Mobility: pt sitting in chair on arrival    Transfers:  Sit to Stand: Contact guard assistance;Assist x1  Stand to Sit: Contact guard assistance  Interventions: Tactile cues; Verbal cues  Level of Assistance: Contact guard assistance     Balance:  Sitting: Intact; Without support  Standing: Impaired; With support  Standing - Static: Good  Standing - Dynamic : Good (with RW)     Ambulation/Gait Training:  Distance (ft): 80 Feet (ft)  Assistive Device: Gait belt;Walker, rolling  Ambulation - Level of Assistance: Contact guard assistance (with RW)  Gait Abnormalities: Decreased step clearance  Right Side Weight Bearing: Full  Left Side Weight Bearing: Full  Base of Support: Narrowed  Speed/Jaimie: Slow  Step Length: Left shortened;Right shortened    Therapeutic Exercises:   sitting  EXERCISE   Sets   Reps   Active Active Assist   Passive   Comments   Ankle pumps 1 10 [x] [] [] bilat   Heel raises 1 10 [x] [] [] \"   Toe tap 1 10 [x] [] [] \"   Knee ext 1 10 [x] [] [] \"   Hip flex 1 10 [x] [] [] \"   Abd & Add 1 10 [x] [] [] \"     Pain:  Pain Scale 1: Numeric (0 - 10)  Pain Intensity 1: 2  Pain Location 1: Back  Pain Orientation 1: Lower  Pain Description 1: Aching; Sore  Pain Intervention(s) 1: Medication (see MAR)     Activity Tolerance: fair    After treatment:   [x] Patient left in no apparent distress sitting up in chair  [] Patient left in no apparent distress in bed  [x] Call bell left within reach  [x] Nursing notified  [] Caregiver present  [] Bed alarm activated    COMMUNICATION/COLLABORATION:   The patients plan of care was discussed with: Registered Nurse    Aj Gale PTA   Time Calculation: 25 mins

## 2018-02-07 NOTE — PROGRESS NOTES
Spiritual Care Assessment/Progress Notes    Pedro Quintanilla Sr. [id number]  [id number]    1950  76 y.o.  male    Patient Telephone Number: There is no home phone number on file. Mosque Affiliation: Anabaptism   Language: English   Extended Emergency Contact Information  Primary Emergency Contact: [address] [facility]  Mobile Phone: [phone]  Relation: Son   Patient Active Problem List    Diagnosis Date Noted    Acute blood loss anemia 02/02/2018    Hematuria 02/02/2018    Upper GI bleeding 02/02/2018    Lower GI bleed 02/02/2018        Date: 2/7/2018       Level of Mosque/Spiritual Activity:  [x]         Involved in fela tradition/spiritual practice    []         Not involved in fela tradition/spiritual practice  [x]         Spiritually oriented    []         Claims no spiritual orientation    []         seeking spiritual identity  []         Feels alienated from Yazidi practice/tradition  []         Feels angry about Yazidi practice/tradition  [x]         Spirituality/Yazidi tradition is a resource for coping at this time.   []         Not able to assess due to medical condition    Services Provided Today:  []         crisis intervention    []         reading Scriptures  [x]         spiritual assessment    []         prayer  []         empathic listening/emotional support  []         rites and rituals (cite in comments)  [x]         life review     []         Yazidi support  []         theological development   []         advocacy  []         ethical dialog     []         blessing  []         bereavement support    []         support to family  []         anticipatory grief support   []         help with AMD  []         spiritual guidance    []         meditation      Spiritual Care Needs  []         Emotional Support  []         Spiritual/Mosque Care  []         Loss/Adjustment  []         Advocacy/Referral                /Ethics  [x]         No needs expressed at               this time  []         Other: (note in               comments)  [address]  []         Follow up visits with               pt/family  []         Provide materials  []         Schedule sacraments  []         Contact Community               Clergy  [x]         Follow up as needed  []         Other: (note in               comments)     Initial Spiritual Assessment in 2120 with LOS pt. Pt sitting up in chair engaged with  upon introduction. Pt shared how he felt this was the third incident in his life where he was sure that God was letting him know that He had more work for pt to do on this earth. 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 Provided active listening and affirmed pt's fela as pt credited God for the positive things in his life. Pt coping well at this time and waiting for discharge. [place] relieved to have improved as he did after initial scare that brought pt to hospital. No needs expressed at this time. Pronounced continued blessings, pt who appeared to be in great spirits voiced appreciation for pastoral visit. ZOE Connelly. Ronald Grace

## 2018-02-07 NOTE — DISCHARGE INSTRUCTIONS
HOSPITALIST DISCHARGE INSTRUCTIONS    NAME: Speedy Resendiz Sr.   :  1950   MRN:  [id number]     Date/Time:  2018 2:35 PM    ADMIT DATE: 2018     DISCHARGE DATE: 2018     DISCHARGE DIAGNOSIS:  Acute Blood Loss Anemia  Duodenal ulcer   Upper GIB   H pylori  Severe Sepsis on   Pulmonary Vascular Congestion on  CXR  Acute Pancreatitis  Orthostatic Hypotension  Hematuria, Suprapubic Tenderness  Mild renal insufficiency  Urinary retenetion    MEDICATIONS:  · It is important that you take the medication exactly as they are prescribed. · Keep your medication in the bottles provided by the pharmacist and keep a list of the medication names, dosages, and times to be taken in your wallet. · Do not take other medications without consulting your doctor. Pain Management: per above medications    What to do at Home    Recommended diet:  Regular Diet    Recommended activity: PT/OT Eval and Treat    If you have questions regarding the hospital related prescriptions or hospital related issues please call [facility] anibal [facility] at . If you experience any of the following symptoms then please call your primary care physician or return to the emergency room if you cannot get hold of your doctor:  Fever, chills, nausea, vomiting, diarrhea, change in mentation, falling, bleeding, shortness of breath      Information obtained by :  I understand that if any problems occur once I am at home I am to contact my physician. I understand and acknowledge receipt of the instructions indicated above.                                                                                                                                            Physician's or R.N.'s Signature                                                                  Date/Time Patient or Representative Signature                                                          Date/Time

## 2018-02-07 NOTE — PROGRESS NOTES
Interdisciplinary Rounds were completed on this patient. Rounds included nursing, clinical care leader, pharmacy, and case management. Patient was doing well without problems. Patient had the following concerns: none. Goals for the day will include: continue RX as is and mobilize.      Anticipated discharge date: 2/7/2018 to [facility]

## 2018-02-07 NOTE — DISCHARGE SUMMARY
Hospitalist Discharge Summary     Patient ID:  Akbar Holcomb  [id number]  76 y.o.  1950    PCP on record: Wilfredo Luis MD    Admit date: 2/2/2018  Discharge date and time: 2/7/2018      DISCHARGE DIAGNOSIS:  Acute Blood Loss Anemia  Duodenal ulcer   Upper GIB   H pylori  Severe Sepsis on 2/1  Pulmonary Vascular Congestion on 2/4 CXR  Acute Pancreatitis  Orthostatic Hypotension  Hematuria, Suprapubic Tenderness  Mild renal insufficiency  Urinary retenetion      CONSULTATIONS:  IP CONSULT TO GASTROENTEROLOGY  IP CONSULT TO UROLOGY    Excerpted HPI from H&P of Hien Washington MD:  Mar Redding is a 76 y.o.  male who presents with nausea and vomiting started last night. Upon questioning color of vomit pt indicated coffee ground with bright red blood. He claims to have 4-5 episodes prior coming to ED. Pt reported that he is been feeling sick for past 13 days since the prostate biopsy at [place]. Pt reported intially noticing hematuria, gross blood in stool but later blood in stool got resolved. He also reports, started to have diarrhea which got resolved 3 days ago. Last night started to have vomiting bright red material. He does report eating beet last night. Pt denies any fever, chills, chest pain, cough, shortness of breath and abdominal pain.     We were asked to admit for work up and evaluation of the above problems. ______________________________________________________________________  DISCHARGE SUMMARY/HOSPITAL COURSE:  for full details see H&P, daily progress notes, labs, consult notes.      Acute Blood Loss Anemia  Duodenal ulcer s/p EGD with Clipping, epi and APC and subsequent embolization in IR on 2/2  Upper GIB with Coffee Ground Emesis and Melena  s/p 3 units pRBC's on 2/2, H&H now remain stable  Continue PPIs and H.Pyloi treatment as below  Gi is on the case     H pylori  Complete 2 weeks coarse of Doxy, Flagyl, Bismuth and PPIs started this admission     Severe Sepsis on 2/1  Lactate improved  PNA ruled out  BCx's remain negative   Patient initially on Meropenem empirically and transitioned to Levaquin and Flagyl on 2/4  Now on treatment for H.Pylori     Pulmonary Vascular Congestion on 2/4 CXR  Repeat CXR with improvement  Likely related to aggressive IV hydration in hospital     Acute Pancreatitis: Drinks one bottle of wine nightly  Lipase improved  RUQ ultrasound only with hpatic steatosis, LFT's normal      Orthostatic Hypotension  resolved     Hematuria, Suprapubic Tenderness  Mild renal insufficiency; baseline renal function unknown; improved  Urinary retenetion  Continue flomax  Urology recommended to continue grady's and have OP followup in office  _______________________________________________________________________  Patient seen and examined by me on discharge day. Pertinent Findings:  Gen:    Not in distress  Chest: Clear lungs  CVS:   Regular rhythm. No edema  Abd:  Soft, not distended, not tender  Neuro:  Alert, non focal  _______________________________________________________________________  DISCHARGE MEDICATIONS:   Current Discharge Medication List      START taking these medications    Details   bismuth subsalicylate (PEPTO-BISMOL) 262 mg/15 mL suspension Take 30 mL by mouth four (4) times daily for 12 days. Qty: 1440 mL, Refills: 0      doxycycline (VIBRA-TABS) 100 mg tablet Take 1 Tab by mouth every twelve (12) hours for 12 days. Qty: 24 Tab, Refills: 0      metroNIDAZOLE (FLAGYL) 500 mg tablet Take 1 Tab by mouth every eight (8) hours for 12 days. Qty: 36 Tab, Refills: 0      pantoprazole (PROTONIX) 40 mg tablet Take 1 Tab by mouth Before breakfast and dinner for 30 days. Qty: 60 Tab, Refills: 0      tamsulosin (FLOMAX) 0.4 mg capsule Take 1 Cap by mouth daily. Qty: 30 Cap, Refills: 0      acetaminophen (TYLENOL) 500 mg tablet Take 1 Tab by mouth every six (6) hours as needed.   Qty: 30 Tab, Refills: 0         STOP taking these medications aspirin 81 mg chewable tablet Comments:   Reason for Stopping:               My Recommended Diet, Activity, Wound Care, and follow-up labs are listed in the patient's Discharge Insturctions which I have personally completed and reviewed. ______________________________________________________________________    Risk of deterioration: Low    Condition at Discharge:  Stable  ______________________________________________________________________    Disposition  IP Rehab  ______________________________________________________________________    Care Plan discussed with:   Patient, RN, Care Manager    ______________________________________________________________________    Code Status: Full Code  ______________________________________________________________________      Follow up with:   PCP : Cristal Patel MD  Follow-up Information     Follow up With Details Comments Contact India León MD Schedule an appointment as soon as possible for a visit in 1 week for post hospital follow up appointment.   [address] [facility]  Raymon Cleveland MD Schedule an appointment as soon as possible for a visit  [place] [address]  [facility] [phone]                Total time in minutes spent coordinating this discharge (includes going over instructions, follow-up, prescriptions, and preparing report for sign off to her PCP) :  35 minutes    Signed:  Regulo Duggan MD

## 2018-02-08 LAB
ALBUMIN SERPL-MCNC: 2 G/DL (ref 3.5–5)
ALBUMIN/GLOB SERPL: 0.7 {RATIO} (ref 1.1–2.2)
ALP SERPL-CCNC: 58 U/L (ref 45–117)
ALT SERPL-CCNC: 15 U/L (ref 12–78)
ANION GAP SERPL CALC-SCNC: 3 MMOL/L (ref 5–15)
AST SERPL-CCNC: 24 U/L (ref 15–37)
BILIRUB SERPL-MCNC: 0.3 MG/DL (ref 0.2–1)
BUN SERPL-MCNC: 10 MG/DL (ref 6–20)
BUN/CREAT SERPL: 10 (ref 12–20)
CALCIUM SERPL-MCNC: 7.8 MG/DL (ref 8.5–10.1)
CHLORIDE SERPL-SCNC: 111 MMOL/L (ref 97–108)
CO2 SERPL-SCNC: 29 MMOL/L (ref 21–32)
CREAT SERPL-MCNC: 0.97 MG/DL (ref 0.7–1.3)
GLOBULIN SER CALC-MCNC: 3 G/DL (ref 2–4)
GLUCOSE SERPL-MCNC: 87 MG/DL (ref 65–100)
HGB BLD-MCNC: 7.5 G/DL (ref 12.1–17)
PHOSPHATE SERPL-MCNC: 2.6 MG/DL (ref 2.6–4.7)
POTASSIUM SERPL-SCNC: 3.4 MMOL/L (ref 3.5–5.1)
PROT SERPL-MCNC: 5 G/DL (ref 6.4–8.2)
SODIUM SERPL-SCNC: 143 MMOL/L (ref 136–145)

## 2018-02-08 PROCEDURE — 85018 HEMOGLOBIN: CPT | Performed by: PHYSICAL MEDICINE & REHABILITATION

## 2018-02-08 PROCEDURE — 84100 ASSAY OF PHOSPHORUS: CPT | Performed by: PHYSICAL MEDICINE & REHABILITATION

## 2018-02-08 PROCEDURE — 36415 COLL VENOUS BLD VENIPUNCTURE: CPT | Performed by: PHYSICAL MEDICINE & REHABILITATION

## 2018-02-08 PROCEDURE — 80053 COMPREHEN METABOLIC PANEL: CPT | Performed by: PHYSICAL MEDICINE & REHABILITATION

## 2018-02-08 PROCEDURE — 74011250637 HC RX REV CODE- 250/637: Performed by: PHYSICAL MEDICINE & REHABILITATION

## 2018-02-08 RX ORDER — POTASSIUM CHLORIDE 750 MG/1
10 TABLET, FILM COATED, EXTENDED RELEASE ORAL DAILY
Status: DISCONTINUED | OUTPATIENT
Start: 2018-02-08 | End: 2018-02-14 | Stop reason: HOSPADM

## 2018-02-08 RX ADMIN — METRONIDAZOLE 500 MG: 250 TABLET ORAL at 14:21

## 2018-02-08 RX ADMIN — BISMUTH SUBSALICYLATE 30 ML: 262 LIQUID ORAL at 13:00

## 2018-02-08 RX ADMIN — TAMSULOSIN HYDROCHLORIDE 0.4 MG: 0.4 CAPSULE ORAL at 09:17

## 2018-02-08 RX ADMIN — DOXYCYCLINE HYCLATE 100 MG: 100 TABLET, COATED ORAL at 20:48

## 2018-02-08 RX ADMIN — ACETAMINOPHEN 650 MG: 325 TABLET ORAL at 16:41

## 2018-02-08 RX ADMIN — MUPIROCIN: 20 OINTMENT TOPICAL at 09:17

## 2018-02-08 RX ADMIN — PANTOPRAZOLE SODIUM 40 MG: 40 TABLET, DELAYED RELEASE ORAL at 09:17

## 2018-02-08 RX ADMIN — BISMUTH SUBSALICYLATE 30 ML: 262 LIQUID ORAL at 09:17

## 2018-02-08 RX ADMIN — MUPIROCIN: 20 OINTMENT TOPICAL at 20:49

## 2018-02-08 RX ADMIN — METRONIDAZOLE 500 MG: 250 TABLET ORAL at 06:15

## 2018-02-08 RX ADMIN — METRONIDAZOLE 500 MG: 250 TABLET ORAL at 22:02

## 2018-02-08 RX ADMIN — BISMUTH SUBSALICYLATE 30 ML: 262 LIQUID ORAL at 16:42

## 2018-02-08 RX ADMIN — POTASSIUM CHLORIDE 10 MEQ: 750 TABLET, EXTENDED RELEASE ORAL at 12:40

## 2018-02-08 RX ADMIN — BISMUTH SUBSALICYLATE 30 ML: 262 LIQUID ORAL at 21:58

## 2018-02-08 RX ADMIN — DOXYCYCLINE HYCLATE 100 MG: 100 TABLET, COATED ORAL at 09:17

## 2018-02-08 RX ADMIN — PANTOPRAZOLE SODIUM 40 MG: 40 TABLET, DELAYED RELEASE ORAL at 20:48

## 2018-02-09 LAB
BACTERIA SPEC CULT: NORMAL
CC UR VC: NORMAL
HGB BLD-MCNC: 7.6 G/DL (ref 12.1–17)
SERVICE CMNT-IMP: NORMAL

## 2018-02-09 PROCEDURE — 74011250637 HC RX REV CODE- 250/637: Performed by: PHYSICAL MEDICINE & REHABILITATION

## 2018-02-09 PROCEDURE — 85018 HEMOGLOBIN: CPT | Performed by: PHYSICAL MEDICINE & REHABILITATION

## 2018-02-09 PROCEDURE — 36415 COLL VENOUS BLD VENIPUNCTURE: CPT | Performed by: PHYSICAL MEDICINE & REHABILITATION

## 2018-02-09 RX ADMIN — METRONIDAZOLE 500 MG: 250 TABLET ORAL at 21:08

## 2018-02-09 RX ADMIN — MUPIROCIN: 20 OINTMENT TOPICAL at 21:09

## 2018-02-09 RX ADMIN — BISMUTH SUBSALICYLATE 30 ML: 262 LIQUID ORAL at 08:12

## 2018-02-09 RX ADMIN — DOXYCYCLINE HYCLATE 100 MG: 100 TABLET, COATED ORAL at 21:08

## 2018-02-09 RX ADMIN — TAMSULOSIN HYDROCHLORIDE 0.4 MG: 0.4 CAPSULE ORAL at 08:12

## 2018-02-09 RX ADMIN — MUPIROCIN: 20 OINTMENT TOPICAL at 08:12

## 2018-02-09 RX ADMIN — METRONIDAZOLE 500 MG: 250 TABLET ORAL at 05:10

## 2018-02-09 RX ADMIN — POTASSIUM CHLORIDE 10 MEQ: 750 TABLET, EXTENDED RELEASE ORAL at 08:12

## 2018-02-09 RX ADMIN — BISMUTH SUBSALICYLATE 30 ML: 262 LIQUID ORAL at 13:04

## 2018-02-09 RX ADMIN — BISMUTH SUBSALICYLATE 30 ML: 262 LIQUID ORAL at 16:14

## 2018-02-09 RX ADMIN — PANTOPRAZOLE SODIUM 40 MG: 40 TABLET, DELAYED RELEASE ORAL at 08:12

## 2018-02-09 RX ADMIN — PANTOPRAZOLE SODIUM 40 MG: 40 TABLET, DELAYED RELEASE ORAL at 21:09

## 2018-02-09 RX ADMIN — BISMUTH SUBSALICYLATE 30 ML: 262 LIQUID ORAL at 21:09

## 2018-02-09 RX ADMIN — METRONIDAZOLE 500 MG: 250 TABLET ORAL at 13:04

## 2018-02-09 RX ADMIN — DOXYCYCLINE HYCLATE 100 MG: 100 TABLET, COATED ORAL at 08:12

## 2018-02-10 LAB — HGB BLD-MCNC: 8.1 G/DL (ref 12.1–17)

## 2018-02-10 PROCEDURE — 74011250637 HC RX REV CODE- 250/637: Performed by: PHYSICAL MEDICINE & REHABILITATION

## 2018-02-10 PROCEDURE — 36415 COLL VENOUS BLD VENIPUNCTURE: CPT | Performed by: PHYSICAL MEDICINE & REHABILITATION

## 2018-02-10 PROCEDURE — 85018 HEMOGLOBIN: CPT | Performed by: PHYSICAL MEDICINE & REHABILITATION

## 2018-02-10 RX ORDER — BISMUTH SUBSALICYLATE 262 MG/15ML
30 LIQUID ORAL
Status: DISCONTINUED | OUTPATIENT
Start: 2018-02-10 | End: 2018-02-14 | Stop reason: HOSPADM

## 2018-02-10 RX ADMIN — TAMSULOSIN HYDROCHLORIDE 0.4 MG: 0.4 CAPSULE ORAL at 09:23

## 2018-02-10 RX ADMIN — BISMUTH SUBSALICYLATE 30 ML: 262 LIQUID ORAL at 09:24

## 2018-02-10 RX ADMIN — METRONIDAZOLE 500 MG: 250 TABLET ORAL at 21:54

## 2018-02-10 RX ADMIN — POTASSIUM CHLORIDE 10 MEQ: 750 TABLET, EXTENDED RELEASE ORAL at 09:23

## 2018-02-10 RX ADMIN — MUPIROCIN: 20 OINTMENT TOPICAL at 09:24

## 2018-02-10 RX ADMIN — METRONIDAZOLE 500 MG: 250 TABLET ORAL at 05:31

## 2018-02-10 RX ADMIN — DOXYCYCLINE HYCLATE 100 MG: 100 TABLET, COATED ORAL at 21:54

## 2018-02-10 RX ADMIN — PANTOPRAZOLE SODIUM 40 MG: 40 TABLET, DELAYED RELEASE ORAL at 21:54

## 2018-02-10 RX ADMIN — METRONIDAZOLE 500 MG: 250 TABLET ORAL at 14:13

## 2018-02-10 RX ADMIN — DOXYCYCLINE HYCLATE 100 MG: 100 TABLET, COATED ORAL at 09:23

## 2018-02-10 RX ADMIN — PANTOPRAZOLE SODIUM 40 MG: 40 TABLET, DELAYED RELEASE ORAL at 09:23

## 2018-02-11 PROCEDURE — 74011250637 HC RX REV CODE- 250/637: Performed by: PHYSICAL MEDICINE & REHABILITATION

## 2018-02-11 RX ADMIN — POTASSIUM CHLORIDE 10 MEQ: 750 TABLET, EXTENDED RELEASE ORAL at 08:52

## 2018-02-11 RX ADMIN — TAMSULOSIN HYDROCHLORIDE 0.4 MG: 0.4 CAPSULE ORAL at 08:52

## 2018-02-11 RX ADMIN — PANTOPRAZOLE SODIUM 40 MG: 40 TABLET, DELAYED RELEASE ORAL at 08:52

## 2018-02-11 RX ADMIN — PANTOPRAZOLE SODIUM 40 MG: 40 TABLET, DELAYED RELEASE ORAL at 22:26

## 2018-02-11 RX ADMIN — DOXYCYCLINE HYCLATE 100 MG: 100 TABLET, COATED ORAL at 08:51

## 2018-02-11 RX ADMIN — METRONIDAZOLE 500 MG: 250 TABLET ORAL at 13:33

## 2018-02-11 RX ADMIN — METRONIDAZOLE 500 MG: 250 TABLET ORAL at 06:28

## 2018-02-11 RX ADMIN — METRONIDAZOLE 500 MG: 250 TABLET ORAL at 22:26

## 2018-02-11 RX ADMIN — DOXYCYCLINE HYCLATE 100 MG: 100 TABLET, COATED ORAL at 22:26

## 2018-02-12 LAB
ERYTHROCYTE [DISTWIDTH] IN BLOOD BY AUTOMATED COUNT: 15.2 % (ref 11.5–14.5)
HCT VFR BLD AUTO: 25.4 % (ref 36.6–50.3)
HGB BLD-MCNC: 8.2 G/DL (ref 12.1–17)
MCH RBC QN AUTO: 30.7 PG (ref 26–34)
MCHC RBC AUTO-ENTMCNC: 32.3 G/DL (ref 30–36.5)
MCV RBC AUTO: 95.1 FL (ref 80–99)
NRBC # BLD: 0 K/UL (ref 0–0.01)
NRBC BLD-RTO: 0 PER 100 WBC
PLATELET # BLD AUTO: 380 K/UL (ref 150–400)
PMV BLD AUTO: 9.5 FL (ref 8.9–12.9)
RBC # BLD AUTO: 2.67 M/UL (ref 4.1–5.7)
WBC # BLD AUTO: 6.7 K/UL (ref 4.1–11.1)

## 2018-02-12 PROCEDURE — 74011250637 HC RX REV CODE- 250/637: Performed by: PHYSICAL MEDICINE & REHABILITATION

## 2018-02-12 PROCEDURE — 85027 COMPLETE CBC AUTOMATED: CPT | Performed by: PHYSICAL MEDICINE & REHABILITATION

## 2018-02-12 PROCEDURE — 36415 COLL VENOUS BLD VENIPUNCTURE: CPT | Performed by: PHYSICAL MEDICINE & REHABILITATION

## 2018-02-12 RX ADMIN — METRONIDAZOLE 500 MG: 250 TABLET ORAL at 05:39

## 2018-02-12 RX ADMIN — BISMUTH SUBSALICYLATE 30 ML: 262 LIQUID ORAL at 08:26

## 2018-02-12 RX ADMIN — ACETAMINOPHEN 650 MG: 325 TABLET ORAL at 18:27

## 2018-02-12 RX ADMIN — METRONIDAZOLE 500 MG: 250 TABLET ORAL at 21:11

## 2018-02-12 RX ADMIN — DOXYCYCLINE HYCLATE 100 MG: 100 TABLET, COATED ORAL at 21:11

## 2018-02-12 RX ADMIN — DOXYCYCLINE HYCLATE 100 MG: 100 TABLET, COATED ORAL at 08:27

## 2018-02-12 RX ADMIN — POTASSIUM CHLORIDE 10 MEQ: 750 TABLET, EXTENDED RELEASE ORAL at 08:27

## 2018-02-12 RX ADMIN — METRONIDAZOLE 500 MG: 250 TABLET ORAL at 15:39

## 2018-02-12 RX ADMIN — PANTOPRAZOLE SODIUM 40 MG: 40 TABLET, DELAYED RELEASE ORAL at 08:27

## 2018-02-12 RX ADMIN — TAMSULOSIN HYDROCHLORIDE 0.4 MG: 0.4 CAPSULE ORAL at 08:27

## 2018-02-12 RX ADMIN — PANTOPRAZOLE SODIUM 40 MG: 40 TABLET, DELAYED RELEASE ORAL at 21:11

## 2018-02-13 LAB
APPEARANCE UR: ABNORMAL
BACTERIA URNS QL MICRO: NEGATIVE /HPF
BILIRUB UR QL: NEGATIVE
COLOR UR: ABNORMAL
EPITH CASTS URNS QL MICRO: ABNORMAL /LPF
GLUCOSE UR STRIP.AUTO-MCNC: NEGATIVE MG/DL
HGB UR QL STRIP: ABNORMAL
HYALINE CASTS URNS QL MICRO: ABNORMAL /LPF (ref 0–5)
KETONES UR QL STRIP.AUTO: NEGATIVE MG/DL
LEUKOCYTE ESTERASE UR QL STRIP.AUTO: NEGATIVE
NITRITE UR QL STRIP.AUTO: NEGATIVE
PH UR STRIP: 6 [PH] (ref 5–8)
PROT UR STRIP-MCNC: NEGATIVE MG/DL
RBC #/AREA URNS HPF: >100 /HPF (ref 0–5)
SP GR UR REFRACTOMETRY: 1.01 (ref 1–1.03)
UROBILINOGEN UR QL STRIP.AUTO: 0.2 EU/DL (ref 0.2–1)
WBC URNS QL MICRO: ABNORMAL /HPF (ref 0–4)

## 2018-02-13 PROCEDURE — 74011000250 HC RX REV CODE- 250

## 2018-02-13 PROCEDURE — 87086 URINE CULTURE/COLONY COUNT: CPT | Performed by: PHYSICAL MEDICINE & REHABILITATION

## 2018-02-13 PROCEDURE — 74011250637 HC RX REV CODE- 250/637: Performed by: PHYSICAL MEDICINE & REHABILITATION

## 2018-02-13 PROCEDURE — 81001 URINALYSIS AUTO W/SCOPE: CPT | Performed by: PHYSICAL MEDICINE & REHABILITATION

## 2018-02-13 RX ORDER — LIDOCAINE HCL/PF 100 MG/5ML
SYRINGE (ML) INTRAVENOUS
Status: COMPLETED
Start: 2018-02-13 | End: 2018-02-13

## 2018-02-13 RX ADMIN — TAMSULOSIN HYDROCHLORIDE 0.4 MG: 0.4 CAPSULE ORAL at 08:48

## 2018-02-13 RX ADMIN — METRONIDAZOLE 500 MG: 250 TABLET ORAL at 14:14

## 2018-02-13 RX ADMIN — DOXYCYCLINE HYCLATE 100 MG: 100 TABLET, COATED ORAL at 21:53

## 2018-02-13 RX ADMIN — PANTOPRAZOLE SODIUM 40 MG: 40 TABLET, DELAYED RELEASE ORAL at 08:48

## 2018-02-13 RX ADMIN — ACETAMINOPHEN 650 MG: 325 TABLET ORAL at 06:28

## 2018-02-13 RX ADMIN — METRONIDAZOLE 500 MG: 250 TABLET ORAL at 06:28

## 2018-02-13 RX ADMIN — ACETAMINOPHEN 650 MG: 325 TABLET ORAL at 00:53

## 2018-02-13 RX ADMIN — PANTOPRAZOLE SODIUM 40 MG: 40 TABLET, DELAYED RELEASE ORAL at 21:53

## 2018-02-13 RX ADMIN — BISMUTH SUBSALICYLATE 30 ML: 262 LIQUID ORAL at 08:48

## 2018-02-13 RX ADMIN — ACETAMINOPHEN 650 MG: 325 TABLET ORAL at 14:14

## 2018-02-13 RX ADMIN — LIDOCAINE HYDROCHLORIDE: 20 INJECTION INTRAVENOUS at 00:54

## 2018-02-13 RX ADMIN — DOXYCYCLINE HYCLATE 100 MG: 100 TABLET, COATED ORAL at 08:48

## 2018-02-13 RX ADMIN — METRONIDAZOLE 500 MG: 250 TABLET ORAL at 21:53

## 2018-02-13 RX ADMIN — POTASSIUM CHLORIDE 10 MEQ: 750 TABLET, EXTENDED RELEASE ORAL at 08:48

## 2018-02-14 PROCEDURE — 74011250637 HC RX REV CODE- 250/637: Performed by: PHYSICAL MEDICINE & REHABILITATION

## 2018-02-14 RX ADMIN — POTASSIUM CHLORIDE 10 MEQ: 750 TABLET, EXTENDED RELEASE ORAL at 08:17

## 2018-02-14 RX ADMIN — DOXYCYCLINE HYCLATE 100 MG: 100 TABLET, COATED ORAL at 08:17

## 2018-02-14 RX ADMIN — PANTOPRAZOLE SODIUM 40 MG: 40 TABLET, DELAYED RELEASE ORAL at 08:17

## 2018-02-14 RX ADMIN — BISMUTH SUBSALICYLATE 30 ML: 262 LIQUID ORAL at 08:17

## 2018-02-14 RX ADMIN — TAMSULOSIN HYDROCHLORIDE 0.4 MG: 0.4 CAPSULE ORAL at 08:17

## 2018-02-14 RX ADMIN — METRONIDAZOLE 500 MG: 250 TABLET ORAL at 06:33

## 2018-02-15 LAB
BACTERIA SPEC CULT: NORMAL
CC UR VC: NORMAL
SERVICE CMNT-IMP: NORMAL

## (undated) DEVICE — BRUSH CYTO BRONCHSCP 1.5/140MM -- CELLEBRITY

## (undated) DEVICE — SET ADMIN 16ML TBNG L100IN 2 Y INJ SITE IV PIGGY BK DISP

## (undated) DEVICE — Device

## (undated) DEVICE — STAIN INDIA INK IN NACL 10ML --

## (undated) DEVICE — TOWEL 4 PLY TISS 19X30 SUE WHT

## (undated) DEVICE — NEEDLE HYPO 18GA L1.5IN PNK S STL HUB POLYPR SHLD REG BVL

## (undated) DEVICE — BLOCK BITE ENDOSCP AD 21 MM W/ DIL BLU LF DISP

## (undated) DEVICE — 1200 GUARD II KIT W/5MM TUBE W/O VAC TUBE: Brand: GUARDIAN

## (undated) DEVICE — MEDI-VAC YANK SUCT HNDL W/TPRD BULBOUS TIP: Brand: CARDINAL HEALTH

## (undated) DEVICE — NEONATAL-ADULT SPO2 SENSOR: Brand: NELLCOR

## (undated) DEVICE — Z DISCONTINUED PER MEDLINE LINE GAS SAMPLING O2/CO2 LNG AD 13 FT NSL W/ TBNG FILTERLINE

## (undated) DEVICE — FORCEPS BX L160CM DIA8MM GRSP DISECT CUP TIP NONLOCKING ROT

## (undated) DEVICE — SYR 10ML LUER LOK 1/5ML GRAD --

## (undated) DEVICE — NEEDLE SCLERO 25GA L240CM OD0.51MM ID0.24MM EXTN L4MM SHTH

## (undated) DEVICE — SYR 3ML LL TIP 1/10ML GRAD --

## (undated) DEVICE — KENDALL RADIOLUCENT FOAM MONITORING ELECTRODE RECTANGULAR SHAPE: Brand: KENDALL

## (undated) DEVICE — CATH IV AUTOGRD BC PNK 20GA 25 -- INSYTE

## (undated) DEVICE — CONTAINER SPEC 20 ML LID NEUT BUFF FORMALIN 10 % POLYPR STS

## (undated) DEVICE — BAG SPEC BIOHZRD 10 X 10 IN --

## (undated) DEVICE — CLIP INT L235CM WRK CHAN DIA2.8MM OPN 11MM LCK MECHANISM MR

## (undated) DEVICE — SOLIDIFIER MEDC 1200ML -- CONVERT TO 356117

## (undated) DEVICE — BASIN EMSIS 16OZ GRAPHITE PLAS KID SHP MOLD GRAD FOR ORAL